# Patient Record
Sex: FEMALE | Race: WHITE | Employment: UNEMPLOYED | URBAN - METROPOLITAN AREA
[De-identification: names, ages, dates, MRNs, and addresses within clinical notes are randomized per-mention and may not be internally consistent; named-entity substitution may affect disease eponyms.]

---

## 2023-09-13 ENCOUNTER — APPOINTMENT (OUTPATIENT)
Dept: CT IMAGING | Age: 78
End: 2023-09-13
Payer: MEDICARE

## 2023-09-13 ENCOUNTER — APPOINTMENT (OUTPATIENT)
Dept: GENERAL RADIOLOGY | Age: 78
End: 2023-09-13
Payer: MEDICARE

## 2023-09-13 ENCOUNTER — ANESTHESIA EVENT (OUTPATIENT)
Dept: OPERATING ROOM | Age: 78
End: 2023-09-13
Payer: MEDICARE

## 2023-09-13 ENCOUNTER — HOSPITAL ENCOUNTER (INPATIENT)
Age: 78
LOS: 8 days | Discharge: NURSING, REHAB OR LONG-TERM CARE | End: 2023-09-21
Attending: EMERGENCY MEDICINE | Admitting: SURGERY
Payer: MEDICARE

## 2023-09-13 DIAGNOSIS — S22.32XA CLOSED FRACTURE OF ONE RIB OF LEFT SIDE, INITIAL ENCOUNTER: ICD-10-CM

## 2023-09-13 DIAGNOSIS — S72.002A CLOSED LEFT HIP FRACTURE, INITIAL ENCOUNTER (HCC): ICD-10-CM
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
## 2023-09-13 DIAGNOSIS — Z01.810 PRE-OPERATIVE CARDIOVASCULAR EXAMINATION, HIGH RISK SURGERY: ICD-10-CM

## 2023-09-13 PROBLEM — S72.143S INTERTROCHANTERIC FRACTURE OF FEMUR, SEQUELA: Status: ACTIVE | Noted: 2023-09-13

## 2023-09-13 LAB
25(OH)D3 SERPL-MCNC: 23 NG/ML
ALBUMIN SERPL-MCNC: 3.6 G/DL (ref 3.5–5.2)
AMPHET UR QL SCN: NEGATIVE
ANION GAP SERPL CALCULATED.3IONS-SCNC: 13 MMOL/L (ref 9–17)
ANION GAP SERPL CALCULATED.3IONS-SCNC: 13 MMOL/L (ref 9–17)
BARBITURATES UR QL SCN: NEGATIVE
BENZODIAZ UR QL: NEGATIVE
BLOOD BANK SPECIMEN: ABNORMAL
BODY TEMPERATURE: 37
BUN SERPL-MCNC: 17 MG/DL (ref 8–23)
BUN SERPL-MCNC: 24 MG/DL (ref 8–23)
CALCIUM SERPL-MCNC: 9.1 MG/DL (ref 8.6–10.4)
CANNABINOIDS UR QL SCN: NEGATIVE
CHLORIDE SERPL-SCNC: 97 MMOL/L (ref 98–107)
CHLORIDE SERPL-SCNC: 98 MMOL/L (ref 98–107)
CO2 SERPL-SCNC: 21 MMOL/L (ref 20–31)
CO2 SERPL-SCNC: 22 MMOL/L (ref 20–31)
COCAINE UR QL SCN: NEGATIVE
COHGB MFR BLD: 3.2 % (ref 0–5)
CREAT SERPL-MCNC: 0.8 MG/DL (ref 0.5–0.9)
CREAT SERPL-MCNC: 1.1 MG/DL (ref 0.5–0.9)
ERYTHROCYTE [DISTWIDTH] IN BLOOD BY AUTOMATED COUNT: 15.5 % (ref 11.8–14.4)
EST. AVERAGE GLUCOSE BLD GHB EST-MCNC: 303 MG/DL
ETHANOL PERCENT: <0.01 %
ETHANOLAMINE SERPL-MCNC: <10 MG/DL
FENTANYL UR QL: NEGATIVE
FIO2 ON VENT: ABNORMAL %
GFR SERPL CREATININE-BSD FRML MDRD: 52 ML/MIN/1.73M2
GFR SERPL CREATININE-BSD FRML MDRD: >60 ML/MIN/1.73M2
GLUCOSE BLD-MCNC: 225 MG/DL (ref 65–105)
GLUCOSE BLD-MCNC: 267 MG/DL (ref 65–105)
GLUCOSE BLD-MCNC: 283 MG/DL (ref 65–105)
GLUCOSE BLD-MCNC: 320 MG/DL (ref 65–105)
GLUCOSE BLD-MCNC: 325 MG/DL (ref 65–105)
GLUCOSE BLD-MCNC: 361 MG/DL (ref 65–105)
GLUCOSE BLD-MCNC: 394 MG/DL (ref 65–105)
GLUCOSE BLD-MCNC: 443 MG/DL (ref 65–105)
GLUCOSE BLD-MCNC: 505 MG/DL (ref 65–105)
GLUCOSE BLD-MCNC: 529 MG/DL (ref 65–105)
GLUCOSE SERPL-MCNC: 276 MG/DL (ref 70–99)
GLUCOSE SERPL-MCNC: 605 MG/DL (ref 70–99)
HBA1C MFR BLD: 12.2 % (ref 4–6)
HCG SERPL QL: ABNORMAL
HCO3 VENOUS: 21.9 MMOL/L (ref 24–30)
HCT VFR BLD AUTO: 32.9 % (ref 36.3–47.1)
HGB BLD-MCNC: 10 G/DL (ref 11.9–15.1)
INR PPP: 1.3
MAGNESIUM SERPL-MCNC: 1.9 MG/DL (ref 1.6–2.6)
MCH RBC QN AUTO: 25.6 PG (ref 25.2–33.5)
MCHC RBC AUTO-ENTMCNC: 30.4 G/DL (ref 28.4–34.8)
MCV RBC AUTO: 84.4 FL (ref 82.6–102.9)
METHADONE UR QL: NEGATIVE
NEGATIVE BASE EXCESS, VEN: 3.1 MMOL/L (ref 0–2)
NRBC BLD-RTO: 0 PER 100 WBC
O2 SAT, VEN: 98.5 % (ref 60–85)
OPIATES UR QL SCN: POSITIVE
OXYCODONE UR QL SCN: NEGATIVE
PARTIAL THROMBOPLASTIN TIME: 23.3 SEC (ref 23–36.5)
PCO2, VEN: 41.6 MM HG (ref 39–55)
PCP UR QL SCN: NEGATIVE
PH VENOUS: 7.34 (ref 7.32–7.42)
PLATELET # BLD AUTO: 235 K/UL (ref 138–453)
PMV BLD AUTO: 11.3 FL (ref 8.1–13.5)
PO2, VEN: 131 MM HG (ref 30–50)
POTASSIUM SERPL-SCNC: 3.8 MMOL/L (ref 3.7–5.3)
POTASSIUM SERPL-SCNC: 4.3 MMOL/L (ref 3.7–5.3)
PROTHROMBIN TIME: 15.6 SEC (ref 11.7–14.9)
RBC # BLD AUTO: 3.9 M/UL (ref 3.95–5.11)
SODIUM SERPL-SCNC: 131 MMOL/L (ref 135–144)
SODIUM SERPL-SCNC: 133 MMOL/L (ref 135–144)
TEST INFORMATION: ABNORMAL
WBC OTHER # BLD: 11.3 K/UL (ref 3.5–11.3)

## 2023-09-13 PROCEDURE — 86850 RBC ANTIBODY SCREEN: CPT

## 2023-09-13 PROCEDURE — 83735 ASSAY OF MAGNESIUM: CPT

## 2023-09-13 PROCEDURE — 82306 VITAMIN D 25 HYDROXY: CPT

## 2023-09-13 PROCEDURE — 86901 BLOOD TYPING SEROLOGIC RH(D): CPT

## 2023-09-13 PROCEDURE — 82805 BLOOD GASES W/O2 SATURATION: CPT

## 2023-09-13 PROCEDURE — 71260 CT THORAX DX C+: CPT

## 2023-09-13 PROCEDURE — 85027 COMPLETE CBC AUTOMATED: CPT

## 2023-09-13 PROCEDURE — 72125 CT NECK SPINE W/O DYE: CPT

## 2023-09-13 PROCEDURE — 6370000000 HC RX 637 (ALT 250 FOR IP): Performed by: NURSE PRACTITIONER

## 2023-09-13 PROCEDURE — 71045 X-RAY EXAM CHEST 1 VIEW: CPT

## 2023-09-13 PROCEDURE — 85610 PROTHROMBIN TIME: CPT

## 2023-09-13 PROCEDURE — 99222 1ST HOSP IP/OBS MODERATE 55: CPT | Performed by: SURGERY

## 2023-09-13 PROCEDURE — 2060000002 HC BURN ICU INTERMEDIATE R&B

## 2023-09-13 PROCEDURE — 80048 BASIC METABOLIC PNL TOTAL CA: CPT

## 2023-09-13 PROCEDURE — 80307 DRUG TEST PRSMV CHEM ANLYZR: CPT

## 2023-09-13 PROCEDURE — 82040 ASSAY OF SERUM ALBUMIN: CPT

## 2023-09-13 PROCEDURE — 73502 X-RAY EXAM HIP UNI 2-3 VIEWS: CPT

## 2023-09-13 PROCEDURE — 73562 X-RAY EXAM OF KNEE 3: CPT

## 2023-09-13 PROCEDURE — 70450 CT HEAD/BRAIN W/O DYE: CPT

## 2023-09-13 PROCEDURE — 93005 ELECTROCARDIOGRAM TRACING: CPT | Performed by: STUDENT IN AN ORGANIZED HEALTH CARE EDUCATION/TRAINING PROGRAM

## 2023-09-13 PROCEDURE — 36415 COLL VENOUS BLD VENIPUNCTURE: CPT

## 2023-09-13 PROCEDURE — 83036 HEMOGLOBIN GLYCOSYLATED A1C: CPT

## 2023-09-13 PROCEDURE — 82947 ASSAY GLUCOSE BLOOD QUANT: CPT

## 2023-09-13 PROCEDURE — 86920 COMPATIBILITY TEST SPIN: CPT

## 2023-09-13 PROCEDURE — 2580000003 HC RX 258: Performed by: NURSE PRACTITIONER

## 2023-09-13 PROCEDURE — 86900 BLOOD TYPING SEROLOGIC ABO: CPT

## 2023-09-13 PROCEDURE — 84520 ASSAY OF UREA NITROGEN: CPT

## 2023-09-13 PROCEDURE — 99233 SBSQ HOSP IP/OBS HIGH 50: CPT | Performed by: INTERNAL MEDICINE

## 2023-09-13 PROCEDURE — 82565 ASSAY OF CREATININE: CPT

## 2023-09-13 PROCEDURE — 80051 ELECTROLYTE PANEL: CPT

## 2023-09-13 PROCEDURE — 99285 EMERGENCY DEPT VISIT HI MDM: CPT

## 2023-09-13 PROCEDURE — G0480 DRUG TEST DEF 1-7 CLASSES: HCPCS

## 2023-09-13 PROCEDURE — 84703 CHORIONIC GONADOTROPIN ASSAY: CPT

## 2023-09-13 PROCEDURE — 96374 THER/PROPH/DIAG INJ IV PUSH: CPT

## 2023-09-13 PROCEDURE — 96375 TX/PRO/DX INJ NEW DRUG ADDON: CPT

## 2023-09-13 PROCEDURE — 6360000002 HC RX W HCPCS: Performed by: EMERGENCY MEDICINE

## 2023-09-13 PROCEDURE — 85730 THROMBOPLASTIN TIME PARTIAL: CPT

## 2023-09-13 PROCEDURE — 6360000004 HC RX CONTRAST MEDICATION: Performed by: EMERGENCY MEDICINE

## 2023-09-13 RX ORDER — ACETAMINOPHEN 500 MG
1000 TABLET ORAL EVERY 8 HOURS SCHEDULED
Status: DISCONTINUED | OUTPATIENT
Start: 2023-09-13 | End: 2023-09-21 | Stop reason: HOSPADM

## 2023-09-13 RX ORDER — OMEPRAZOLE 10 MG/1
40 CAPSULE, DELAYED RELEASE ORAL DAILY
COMMUNITY

## 2023-09-13 RX ORDER — GABAPENTIN 300 MG/1
300 CAPSULE ORAL 3 TIMES DAILY
Status: DISCONTINUED | OUTPATIENT
Start: 2023-09-13 | End: 2023-09-18

## 2023-09-13 RX ORDER — FUROSEMIDE 20 MG/1
20 TABLET ORAL 2 TIMES DAILY
COMMUNITY

## 2023-09-13 RX ORDER — OXYCODONE HYDROCHLORIDE 5 MG/1
5 TABLET ORAL EVERY 4 HOURS PRN
Status: DISCONTINUED | OUTPATIENT
Start: 2023-09-13 | End: 2023-09-18

## 2023-09-13 RX ORDER — SENNA AND DOCUSATE SODIUM 50; 8.6 MG/1; MG/1
1 TABLET, FILM COATED ORAL 2 TIMES DAILY
Status: DISCONTINUED | OUTPATIENT
Start: 2023-09-13 | End: 2023-09-20

## 2023-09-13 RX ORDER — LOSARTAN POTASSIUM 50 MG/1
50 TABLET ORAL DAILY
COMMUNITY

## 2023-09-13 RX ORDER — METFORMIN HYDROCHLORIDE 500 MG/1
500 TABLET, EXTENDED RELEASE ORAL NIGHTLY
COMMUNITY

## 2023-09-13 RX ORDER — DEXTROSE MONOHYDRATE 100 MG/ML
INJECTION, SOLUTION INTRAVENOUS CONTINUOUS PRN
Status: DISCONTINUED | OUTPATIENT
Start: 2023-09-13 | End: 2023-09-19

## 2023-09-13 RX ORDER — SPIRONOLACTONE 50 MG/1
50 TABLET, FILM COATED ORAL DAILY
COMMUNITY

## 2023-09-13 RX ORDER — MORPHINE SULFATE 4 MG/ML
4 INJECTION, SOLUTION INTRAMUSCULAR; INTRAVENOUS ONCE
Status: COMPLETED | OUTPATIENT
Start: 2023-09-13 | End: 2023-09-13

## 2023-09-13 RX ORDER — DEXTROSE MONOHYDRATE 100 MG/ML
INJECTION, SOLUTION INTRAVENOUS CONTINUOUS PRN
Status: CANCELLED | OUTPATIENT
Start: 2023-09-13

## 2023-09-13 RX ORDER — POLYETHYLENE GLYCOL 3350 17 G/17G
17 POWDER, FOR SOLUTION ORAL DAILY
Status: DISCONTINUED | OUTPATIENT
Start: 2023-09-13 | End: 2023-09-21 | Stop reason: HOSPADM

## 2023-09-13 RX ORDER — GLIMEPIRIDE 4 MG/1
4 TABLET ORAL 2 TIMES DAILY
COMMUNITY

## 2023-09-13 RX ORDER — MAGNESIUM HYDROXIDE/ALUMINUM HYDROXICE/SIMETHICONE 120; 1200; 1200 MG/30ML; MG/30ML; MG/30ML
30 SUSPENSION ORAL EVERY 6 HOURS PRN
Status: DISCONTINUED | OUTPATIENT
Start: 2023-09-13 | End: 2023-09-21 | Stop reason: HOSPADM

## 2023-09-13 RX ORDER — ONDANSETRON 2 MG/ML
4 INJECTION INTRAMUSCULAR; INTRAVENOUS ONCE
Status: COMPLETED | OUTPATIENT
Start: 2023-09-13 | End: 2023-09-13

## 2023-09-13 RX ORDER — METHOCARBAMOL 750 MG/1
750 TABLET, FILM COATED ORAL 4 TIMES DAILY
Status: DISCONTINUED | OUTPATIENT
Start: 2023-09-13 | End: 2023-09-18

## 2023-09-13 RX ADMIN — MORPHINE SULFATE 4 MG: 4 INJECTION INTRAVENOUS at 11:28

## 2023-09-13 RX ADMIN — SENNOSIDES AND DOCUSATE SODIUM 1 TABLET: 50; 8.6 TABLET ORAL at 21:37

## 2023-09-13 RX ADMIN — GABAPENTIN 300 MG: 300 CAPSULE ORAL at 21:38

## 2023-09-13 RX ADMIN — ACETAMINOPHEN 1000 MG: 500 TABLET ORAL at 21:37

## 2023-09-13 RX ADMIN — SODIUM CHLORIDE 8.9 UNITS/HR: 9 INJECTION, SOLUTION INTRAVENOUS at 14:24

## 2023-09-13 RX ADMIN — SODIUM CHLORIDE 15.61 UNITS/HR: 9 INJECTION, SOLUTION INTRAVENOUS at 21:46

## 2023-09-13 RX ADMIN — OXYCODONE HYDROCHLORIDE 5 MG: 5 TABLET ORAL at 19:31

## 2023-09-13 RX ADMIN — METHOCARBAMOL 750 MG: 750 TABLET ORAL at 21:37

## 2023-09-13 RX ADMIN — IOPAMIDOL 75 ML: 755 INJECTION, SOLUTION INTRAVENOUS at 11:36

## 2023-09-13 RX ADMIN — ONDANSETRON 4 MG: 2 INJECTION INTRAMUSCULAR; INTRAVENOUS at 11:28

## 2023-09-13 ASSESSMENT — PAIN DESCRIPTION - ORIENTATION
ORIENTATION: LEFT

## 2023-09-13 ASSESSMENT — PAIN SCALES - GENERAL
PAINLEVEL_OUTOF10: 5
PAINLEVEL_OUTOF10: 7
PAINLEVEL_OUTOF10: 8
PAINLEVEL_OUTOF10: 6

## 2023-09-13 ASSESSMENT — PAIN - FUNCTIONAL ASSESSMENT
PAIN_FUNCTIONAL_ASSESSMENT: PREVENTS OR INTERFERES SOME ACTIVE ACTIVITIES AND ADLS
PAIN_FUNCTIONAL_ASSESSMENT: 0-10
PAIN_FUNCTIONAL_ASSESSMENT: PREVENTS OR INTERFERES SOME ACTIVE ACTIVITIES AND ADLS
PAIN_FUNCTIONAL_ASSESSMENT: PREVENTS OR INTERFERES SOME ACTIVE ACTIVITIES AND ADLS

## 2023-09-13 ASSESSMENT — PAIN DESCRIPTION - PAIN TYPE
TYPE: ACUTE PAIN
TYPE: ACUTE PAIN

## 2023-09-13 ASSESSMENT — PAIN DESCRIPTION - LOCATION
LOCATION: HIP

## 2023-09-13 ASSESSMENT — PAIN DESCRIPTION - DESCRIPTORS
DESCRIPTORS: DISCOMFORT

## 2023-09-13 ASSESSMENT — PAIN DESCRIPTION - FREQUENCY: FREQUENCY: CONTINUOUS

## 2023-09-13 NOTE — ED NOTES
Per spiritual care, unable to contact son or daughter in law, voice messages left      Daly Loja RN  09/13/23 5261
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
## 2023-09-13 NOTE — PROGRESS NOTES
15 Variable Trauma-Specific Frailty Index   Comorbidities   Cancer History Yes (1) No (0)   Coronary Heart Disease MI (1) CABG (0.75) Mild (0.25)  No (0)   Dementia Severe (1) Moderate (0.5) Mild (0.25)  No (0)   Daily Activities   Help With Grooming Yes (1) No (0)   Help with Managing Money Yes (1) No (0)   Help doing Housework Yes (1) No (0)   Help with Toileting Yes (1) No (0)   Help Walking Wheelchair (1) Walker (0.75) Cane (0.5) No (0)   Health Attitude   Feel Less Useful Most Time (1) Sometimes (0.5) Never (0)   Feel Sad Most Time (1) Sometimes (0.5) Never (0)   Feel Effort to Do Everything Most Time (1) Sometimes (0.5) Never (0)   Feels Lonely Most Time (1) Sometimes (0.5) Never (0)   Falls Most Time (1) Sometimes (0.5) Never (0)   Function   Sexually Active Yes (0)  No(1)   Nutrition   Albumin < 3g/dL (1)  > 3g/dL   Scoring   Score  1 FI (Score/15)  0.06 > 0.25 = Frail   *Based on 2-weeks prior to hospital admission   Trauma Specific Fraility Index > or = to 4 (4/15 = 0.26)  Trauma Specific Fraility Index Score:    0.06
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
## 2023-09-13 NOTE — ED NOTES
Writer entered room pt find pt laying on side. Pt educated on importance of laying on her back. Pt acknowledged teaching.       Alem Bauer RN  09/13/23 6486

## 2023-09-14 ENCOUNTER — ANESTHESIA (OUTPATIENT)
Dept: OPERATING ROOM | Age: 78
End: 2023-09-14
Payer: MEDICARE

## 2023-09-14 ENCOUNTER — APPOINTMENT (OUTPATIENT)
Dept: GENERAL RADIOLOGY | Age: 78
End: 2023-09-14
Payer: MEDICARE

## 2023-09-14 PROBLEM — K74.60 CIRRHOSIS OF LIVER WITH ASCITES (HCC): Status: ACTIVE | Noted: 2023-09-14

## 2023-09-14 PROBLEM — E11.65 TYPE 2 DIABETES MELLITUS WITH HYPERGLYCEMIA, WITHOUT LONG-TERM CURRENT USE OF INSULIN (HCC): Status: ACTIVE | Noted: 2023-09-14

## 2023-09-14 PROBLEM — R18.8 CIRRHOSIS OF LIVER WITH ASCITES (HCC): Status: ACTIVE | Noted: 2023-09-14

## 2023-09-14 PROBLEM — Z87.81 S/P LEFT HIP FRACTURE: Status: ACTIVE | Noted: 2023-09-14

## 2023-09-14 LAB
ALBUMIN SERPL-MCNC: 3.2 G/DL (ref 3.5–5.2)
ALBUMIN/GLOB SERPL: 1.2 {RATIO} (ref 1–2.5)
ALP SERPL-CCNC: 96 U/L (ref 35–104)
ALT SERPL-CCNC: 20 U/L (ref 5–33)
ANION GAP SERPL CALCULATED.3IONS-SCNC: 15 MMOL/L (ref 9–17)
AST SERPL-CCNC: 26 U/L
BASOPHILS # BLD: 0.03 K/UL (ref 0–0.2)
BASOPHILS NFR BLD: 0 % (ref 0–2)
BILIRUB SERPL-MCNC: 0.5 MG/DL (ref 0.3–1.2)
BUN SERPL-MCNC: 32 MG/DL (ref 8–23)
CALCIUM SERPL-MCNC: 8.7 MG/DL (ref 8.6–10.4)
CHLORIDE SERPL-SCNC: 93 MMOL/L (ref 98–107)
CO2 SERPL-SCNC: 21 MMOL/L (ref 20–31)
CREAT SERPL-MCNC: 1.1 MG/DL (ref 0.5–0.9)
EOSINOPHIL # BLD: 0.03 K/UL (ref 0–0.44)
EOSINOPHILS RELATIVE PERCENT: 0 % (ref 1–4)
ERYTHROCYTE [DISTWIDTH] IN BLOOD BY AUTOMATED COUNT: 16.1 % (ref 11.8–14.4)
GFR SERPL CREATININE-BSD FRML MDRD: 52 ML/MIN/1.73M2
GLUCOSE BLD-MCNC: 107 MG/DL (ref 65–105)
GLUCOSE BLD-MCNC: 119 MG/DL (ref 65–105)
GLUCOSE BLD-MCNC: 128 MG/DL (ref 65–105)
GLUCOSE BLD-MCNC: 134 MG/DL (ref 65–105)
GLUCOSE BLD-MCNC: 136 MG/DL (ref 65–105)
GLUCOSE BLD-MCNC: 139 MG/DL (ref 65–105)
GLUCOSE BLD-MCNC: 142 MG/DL (ref 65–105)
GLUCOSE BLD-MCNC: 144 MG/DL (ref 65–105)
GLUCOSE BLD-MCNC: 149 MG/DL (ref 65–105)
GLUCOSE BLD-MCNC: 151 MG/DL (ref 65–105)
GLUCOSE BLD-MCNC: 163 MG/DL (ref 65–105)
GLUCOSE BLD-MCNC: 178 MG/DL (ref 65–105)
GLUCOSE BLD-MCNC: 319 MG/DL (ref 65–105)
GLUCOSE BLD-MCNC: 377 MG/DL (ref 65–105)
GLUCOSE BLD-MCNC: 572 MG/DL (ref 65–105)
GLUCOSE BLD-MCNC: 81 MG/DL (ref 65–105)
GLUCOSE BLD-MCNC: 84 MG/DL (ref 65–105)
GLUCOSE SERPL-MCNC: 353 MG/DL (ref 70–99)
HCT VFR BLD AUTO: 26.8 % (ref 36.3–47.1)
HGB BLD-MCNC: 8.2 G/DL (ref 11.9–15.1)
IMM GRANULOCYTES # BLD AUTO: 0.07 K/UL (ref 0–0.3)
IMM GRANULOCYTES NFR BLD: 1 %
LYMPHOCYTES NFR BLD: 0.87 K/UL (ref 1.1–3.7)
LYMPHOCYTES RELATIVE PERCENT: 7 % (ref 24–43)
MCH RBC QN AUTO: 26.5 PG (ref 25.2–33.5)
MCHC RBC AUTO-ENTMCNC: 30.6 G/DL (ref 28.4–34.8)
MCV RBC AUTO: 86.5 FL (ref 82.6–102.9)
MONOCYTES NFR BLD: 1.43 K/UL (ref 0.1–1.2)
MONOCYTES NFR BLD: 11 % (ref 3–12)
NEUTROPHILS NFR BLD: 81 % (ref 36–65)
NEUTS SEG NFR BLD: 10.49 K/UL (ref 1.5–8.1)
NRBC BLD-RTO: 0 PER 100 WBC
PLATELET # BLD AUTO: 179 K/UL (ref 138–453)
PMV BLD AUTO: 12.2 FL (ref 8.1–13.5)
POTASSIUM SERPL-SCNC: 4.9 MMOL/L (ref 3.7–5.3)
PROT SERPL-MCNC: 5.8 G/DL (ref 6.4–8.3)
RBC # BLD AUTO: 3.1 M/UL (ref 3.95–5.11)
RBC # BLD: ABNORMAL 10*6/UL
SODIUM SERPL-SCNC: 129 MMOL/L (ref 135–144)
WBC OTHER # BLD: 12.9 K/UL (ref 3.5–11.3)

## 2023-09-14 PROCEDURE — 6360000002 HC RX W HCPCS

## 2023-09-14 PROCEDURE — 3700000000 HC ANESTHESIA ATTENDED CARE: Performed by: ORTHOPAEDIC SURGERY

## 2023-09-14 PROCEDURE — 6360000002 HC RX W HCPCS: Performed by: ANESTHESIOLOGY

## 2023-09-14 PROCEDURE — 2580000003 HC RX 258

## 2023-09-14 PROCEDURE — 85025 COMPLETE CBC W/AUTO DIFF WBC: CPT

## 2023-09-14 PROCEDURE — 99222 1ST HOSP IP/OBS MODERATE 55: CPT | Performed by: ORTHOPAEDIC SURGERY

## 2023-09-14 PROCEDURE — 3700000001 HC ADD 15 MINUTES (ANESTHESIA): Performed by: ORTHOPAEDIC SURGERY

## 2023-09-14 PROCEDURE — 36415 COLL VENOUS BLD VENIPUNCTURE: CPT

## 2023-09-14 PROCEDURE — C9399 UNCLASSIFIED DRUGS OR BIOLOG: HCPCS | Performed by: ANESTHESIOLOGY

## 2023-09-14 PROCEDURE — 6370000000 HC RX 637 (ALT 250 FOR IP): Performed by: NURSE PRACTITIONER

## 2023-09-14 PROCEDURE — 2580000003 HC RX 258: Performed by: ORTHOPAEDIC SURGERY

## 2023-09-14 PROCEDURE — 1200000000 HC SEMI PRIVATE

## 2023-09-14 PROCEDURE — C1713 ANCHOR/SCREW BN/BN,TIS/BN: HCPCS | Performed by: ORTHOPAEDIC SURGERY

## 2023-09-14 PROCEDURE — 3600000005 HC SURGERY LEVEL 5 BASE: Performed by: ORTHOPAEDIC SURGERY

## 2023-09-14 PROCEDURE — 99222 1ST HOSP IP/OBS MODERATE 55: CPT | Performed by: STUDENT IN AN ORGANIZED HEALTH CARE EDUCATION/TRAINING PROGRAM

## 2023-09-14 PROCEDURE — 73502 X-RAY EXAM HIP UNI 2-3 VIEWS: CPT

## 2023-09-14 PROCEDURE — 6370000000 HC RX 637 (ALT 250 FOR IP)

## 2023-09-14 PROCEDURE — 7100000000 HC PACU RECOVERY - FIRST 15 MIN: Performed by: ORTHOPAEDIC SURGERY

## 2023-09-14 PROCEDURE — 73552 X-RAY EXAM OF FEMUR 2/>: CPT

## 2023-09-14 PROCEDURE — 2720000010 HC SURG SUPPLY STERILE: Performed by: ORTHOPAEDIC SURGERY

## 2023-09-14 PROCEDURE — 82947 ASSAY GLUCOSE BLOOD QUANT: CPT

## 2023-09-14 PROCEDURE — 7100000001 HC PACU RECOVERY - ADDTL 15 MIN: Performed by: ORTHOPAEDIC SURGERY

## 2023-09-14 PROCEDURE — 80053 COMPREHEN METABOLIC PANEL: CPT

## 2023-09-14 PROCEDURE — 3600000015 HC SURGERY LEVEL 5 ADDTL 15MIN: Performed by: ORTHOPAEDIC SURGERY

## 2023-09-14 PROCEDURE — 2580000003 HC RX 258: Performed by: ANESTHESIOLOGY

## 2023-09-14 PROCEDURE — 2500000003 HC RX 250 WO HCPCS: Performed by: ANESTHESIOLOGY

## 2023-09-14 PROCEDURE — 99232 SBSQ HOSP IP/OBS MODERATE 35: CPT | Performed by: SURGERY

## 2023-09-14 PROCEDURE — 2709999900 HC NON-CHARGEABLE SUPPLY: Performed by: ORTHOPAEDIC SURGERY

## 2023-09-14 PROCEDURE — 0QSC04Z REPOSITION LEFT LOWER FEMUR WITH INTERNAL FIXATION DEVICE, OPEN APPROACH: ICD-10-PCS | Performed by: ORTHOPAEDIC SURGERY

## 2023-09-14 DEVICE — TRIGEN LOW PROFILE SCREW 5.0MM X 32.5MM
Type: IMPLANTABLE DEVICE | Site: FEMUR | Status: NON-FUNCTIONAL
Brand: TRIGEN
Removed: 2023-09-15

## 2023-09-14 DEVICE — TRIGEN INTERTAN 10S 10MM X 36CM 125DEGREE LEFT
Type: IMPLANTABLE DEVICE | Site: FEMUR | Status: NON-FUNCTIONAL
Brand: TRIGEN
Removed: 2023-09-15

## 2023-09-14 DEVICE — TRIGEN LOW PROFILE SCREW 5.0MM X 27.5MM
Type: IMPLANTABLE DEVICE | Site: FEMUR | Status: NON-FUNCTIONAL
Brand: TRIGEN
Removed: 2023-09-15

## 2023-09-14 DEVICE — INTERTAN LAG/COMPRESSION SCREW KIT                                    80MM / 75MM
Type: IMPLANTABLE DEVICE | Site: FEMUR | Status: FUNCTIONAL
Brand: TRIGEN

## 2023-09-14 RX ORDER — SODIUM CHLORIDE, SODIUM LACTATE, POTASSIUM CHLORIDE, CALCIUM CHLORIDE 600; 310; 30; 20 MG/100ML; MG/100ML; MG/100ML; MG/100ML
INJECTION, SOLUTION INTRAVENOUS CONTINUOUS
Status: DISCONTINUED | OUTPATIENT
Start: 2023-09-14 | End: 2023-09-15

## 2023-09-14 RX ORDER — FENTANYL CITRATE 50 UG/ML
25 INJECTION, SOLUTION INTRAMUSCULAR; INTRAVENOUS EVERY 5 MIN PRN
Status: DISCONTINUED | OUTPATIENT
Start: 2023-09-14 | End: 2023-09-14 | Stop reason: HOSPADM

## 2023-09-14 RX ORDER — MORPHINE SULFATE 2 MG/ML
2 INJECTION, SOLUTION INTRAMUSCULAR; INTRAVENOUS EVERY 5 MIN PRN
Status: DISCONTINUED | OUTPATIENT
Start: 2023-09-14 | End: 2023-09-14 | Stop reason: HOSPADM

## 2023-09-14 RX ORDER — DEXAMETHASONE SODIUM PHOSPHATE 10 MG/ML
INJECTION INTRAMUSCULAR; INTRAVENOUS PRN
Status: DISCONTINUED | OUTPATIENT
Start: 2023-09-14 | End: 2023-09-14 | Stop reason: SDUPTHER

## 2023-09-14 RX ORDER — ONDANSETRON 2 MG/ML
INJECTION INTRAMUSCULAR; INTRAVENOUS PRN
Status: DISCONTINUED | OUTPATIENT
Start: 2023-09-14 | End: 2023-09-14 | Stop reason: SDUPTHER

## 2023-09-14 RX ORDER — ROCURONIUM BROMIDE 10 MG/ML
INJECTION, SOLUTION INTRAVENOUS PRN
Status: DISCONTINUED | OUTPATIENT
Start: 2023-09-14 | End: 2023-09-14 | Stop reason: SDUPTHER

## 2023-09-14 RX ORDER — SODIUM CHLORIDE 9 MG/ML
INJECTION, SOLUTION INTRAVENOUS PRN
Status: DISCONTINUED | OUTPATIENT
Start: 2023-09-14 | End: 2023-09-14 | Stop reason: HOSPADM

## 2023-09-14 RX ORDER — INSULIN LISPRO 100 [IU]/ML
0-16 INJECTION, SOLUTION INTRAVENOUS; SUBCUTANEOUS
Status: DISCONTINUED | OUTPATIENT
Start: 2023-09-14 | End: 2023-09-14

## 2023-09-14 RX ORDER — PROPOFOL 10 MG/ML
INJECTION, EMULSION INTRAVENOUS PRN
Status: DISCONTINUED | OUTPATIENT
Start: 2023-09-14 | End: 2023-09-14 | Stop reason: SDUPTHER

## 2023-09-14 RX ORDER — MAGNESIUM SULFATE HEPTAHYDRATE 40 MG/ML
INJECTION, SOLUTION INTRAVENOUS PRN
Status: DISCONTINUED | OUTPATIENT
Start: 2023-09-14 | End: 2023-09-14 | Stop reason: SDUPTHER

## 2023-09-14 RX ORDER — SODIUM CHLORIDE 0.9 % (FLUSH) 0.9 %
5-40 SYRINGE (ML) INJECTION PRN
Status: DISCONTINUED | OUTPATIENT
Start: 2023-09-14 | End: 2023-09-14 | Stop reason: HOSPADM

## 2023-09-14 RX ORDER — ONDANSETRON 2 MG/ML
4 INJECTION INTRAMUSCULAR; INTRAVENOUS
Status: DISCONTINUED | OUTPATIENT
Start: 2023-09-14 | End: 2023-09-14 | Stop reason: HOSPADM

## 2023-09-14 RX ORDER — MAGNESIUM HYDROXIDE 1200 MG/15ML
LIQUID ORAL CONTINUOUS PRN
Status: COMPLETED | OUTPATIENT
Start: 2023-09-14 | End: 2023-09-14

## 2023-09-14 RX ORDER — MAGNESIUM HYDROXIDE 1200 MG/15ML
LIQUID ORAL PRN
Status: DISCONTINUED | OUTPATIENT
Start: 2023-09-14 | End: 2023-09-14 | Stop reason: ALTCHOICE

## 2023-09-14 RX ORDER — INSULIN LISPRO 100 [IU]/ML
0-4 INJECTION, SOLUTION INTRAVENOUS; SUBCUTANEOUS NIGHTLY
Status: DISCONTINUED | OUTPATIENT
Start: 2023-09-14 | End: 2023-09-14

## 2023-09-14 RX ORDER — SODIUM CHLORIDE, SODIUM LACTATE, POTASSIUM CHLORIDE, CALCIUM CHLORIDE 600; 310; 30; 20 MG/100ML; MG/100ML; MG/100ML; MG/100ML
INJECTION, SOLUTION INTRAVENOUS CONTINUOUS PRN
Status: DISCONTINUED | OUTPATIENT
Start: 2023-09-14 | End: 2023-09-14 | Stop reason: SDUPTHER

## 2023-09-14 RX ORDER — SODIUM CHLORIDE, SODIUM LACTATE, POTASSIUM CHLORIDE, CALCIUM CHLORIDE 600; 310; 30; 20 MG/100ML; MG/100ML; MG/100ML; MG/100ML
INJECTION, SOLUTION INTRAVENOUS CONTINUOUS
Status: DISCONTINUED | OUTPATIENT
Start: 2023-09-14 | End: 2023-09-14

## 2023-09-14 RX ORDER — DIPHENHYDRAMINE HYDROCHLORIDE 50 MG/ML
12.5 INJECTION INTRAMUSCULAR; INTRAVENOUS
Status: DISCONTINUED | OUTPATIENT
Start: 2023-09-14 | End: 2023-09-14 | Stop reason: HOSPADM

## 2023-09-14 RX ORDER — ERGOCALCIFEROL 1.25 MG/1
50000 CAPSULE ORAL WEEKLY
Qty: 12 CAPSULE | Refills: 1 | Status: SHIPPED | OUTPATIENT
Start: 2023-09-14 | End: 2023-09-21 | Stop reason: SDUPTHER

## 2023-09-14 RX ORDER — LIDOCAINE HYDROCHLORIDE 10 MG/ML
INJECTION, SOLUTION INFILTRATION; PERINEURAL PRN
Status: DISCONTINUED | OUTPATIENT
Start: 2023-09-14 | End: 2023-09-14 | Stop reason: SDUPTHER

## 2023-09-14 RX ORDER — SODIUM CHLORIDE 0.9 % (FLUSH) 0.9 %
5-40 SYRINGE (ML) INJECTION EVERY 12 HOURS SCHEDULED
Status: DISCONTINUED | OUTPATIENT
Start: 2023-09-14 | End: 2023-09-14 | Stop reason: HOSPADM

## 2023-09-14 RX ORDER — FENTANYL CITRATE 50 UG/ML
INJECTION, SOLUTION INTRAMUSCULAR; INTRAVENOUS PRN
Status: DISCONTINUED | OUTPATIENT
Start: 2023-09-14 | End: 2023-09-14 | Stop reason: SDUPTHER

## 2023-09-14 RX ADMIN — ROCURONIUM BROMIDE 10 MG: 10 INJECTION, SOLUTION INTRAVENOUS at 08:23

## 2023-09-14 RX ADMIN — FENTANYL CITRATE 25 MCG: 50 INJECTION, SOLUTION INTRAMUSCULAR; INTRAVENOUS at 08:12

## 2023-09-14 RX ADMIN — METHOCARBAMOL 750 MG: 750 TABLET ORAL at 19:52

## 2023-09-14 RX ADMIN — LIDOCAINE HYDROCHLORIDE 50 MG: 10 INJECTION, SOLUTION EPIDURAL; INFILTRATION; INTRACAUDAL; PERINEURAL at 07:33

## 2023-09-14 RX ADMIN — PROPOFOL 100 MG: 10 INJECTION, EMULSION INTRAVENOUS at 07:33

## 2023-09-14 RX ADMIN — SENNOSIDES AND DOCUSATE SODIUM 1 TABLET: 50; 8.6 TABLET ORAL at 20:57

## 2023-09-14 RX ADMIN — PHENYLEPHRINE HYDROCHLORIDE 100 MCG: 10 INJECTION INTRAVENOUS at 07:52

## 2023-09-14 RX ADMIN — ROCURONIUM BROMIDE 10 MG: 10 INJECTION, SOLUTION INTRAVENOUS at 09:36

## 2023-09-14 RX ADMIN — ROCURONIUM BROMIDE 50 MG: 10 INJECTION, SOLUTION INTRAVENOUS at 07:33

## 2023-09-14 RX ADMIN — SODIUM CHLORIDE, POTASSIUM CHLORIDE, SODIUM LACTATE AND CALCIUM CHLORIDE: 600; 310; 30; 20 INJECTION, SOLUTION INTRAVENOUS at 01:39

## 2023-09-14 RX ADMIN — SODIUM CHLORIDE, POTASSIUM CHLORIDE, SODIUM LACTATE AND CALCIUM CHLORIDE: 600; 310; 30; 20 INJECTION, SOLUTION INTRAVENOUS at 19:53

## 2023-09-14 RX ADMIN — SODIUM CHLORIDE, POTASSIUM CHLORIDE, SODIUM LACTATE AND CALCIUM CHLORIDE: 600; 310; 30; 20 INJECTION, SOLUTION INTRAVENOUS at 07:24

## 2023-09-14 RX ADMIN — ACETAMINOPHEN 1000 MG: 500 TABLET ORAL at 13:58

## 2023-09-14 RX ADMIN — FENTANYL CITRATE 50 MCG: 50 INJECTION, SOLUTION INTRAMUSCULAR; INTRAVENOUS at 07:33

## 2023-09-14 RX ADMIN — FENTANYL CITRATE 25 MCG: 50 INJECTION, SOLUTION INTRAMUSCULAR; INTRAVENOUS at 08:23

## 2023-09-14 RX ADMIN — GABAPENTIN 300 MG: 300 CAPSULE ORAL at 20:57

## 2023-09-14 RX ADMIN — DEXAMETHASONE SODIUM PHOSPHATE 10 MG: 10 INJECTION INTRAMUSCULAR; INTRAVENOUS at 07:48

## 2023-09-14 RX ADMIN — METHOCARBAMOL 750 MG: 750 TABLET ORAL at 13:59

## 2023-09-14 RX ADMIN — SUGAMMADEX 120 MG: 100 INJECTION, SOLUTION INTRAVENOUS at 10:17

## 2023-09-14 RX ADMIN — FENTANYL CITRATE 25 MCG: 50 INJECTION INTRAMUSCULAR; INTRAVENOUS at 10:58

## 2023-09-14 RX ADMIN — PHENYLEPHRINE HYDROCHLORIDE 50 MCG/MIN: 10 INJECTION INTRAVENOUS at 08:05

## 2023-09-14 RX ADMIN — MAGNESIUM SULFATE HEPTAHYDRATE 2000 MG: 40 INJECTION, SOLUTION INTRAVENOUS at 08:06

## 2023-09-14 RX ADMIN — FENTANYL CITRATE 25 MCG: 50 INJECTION INTRAMUSCULAR; INTRAVENOUS at 10:50

## 2023-09-14 RX ADMIN — ONDANSETRON 4 MG: 2 INJECTION INTRAMUSCULAR; INTRAVENOUS at 09:53

## 2023-09-14 RX ADMIN — PHENYLEPHRINE HYDROCHLORIDE 100 MCG: 10 INJECTION INTRAVENOUS at 07:49

## 2023-09-14 RX ADMIN — FENTANYL CITRATE 50 MCG: 50 INJECTION, SOLUTION INTRAMUSCULAR; INTRAVENOUS at 09:35

## 2023-09-14 RX ADMIN — ACETAMINOPHEN 1000 MG: 500 TABLET ORAL at 20:57

## 2023-09-14 RX ADMIN — PHENYLEPHRINE HYDROCHLORIDE 100 MCG: 10 INJECTION INTRAVENOUS at 07:57

## 2023-09-14 RX ADMIN — INSULIN LISPRO 16 UNITS: 100 INJECTION, SOLUTION INTRAVENOUS; SUBCUTANEOUS at 17:00

## 2023-09-14 RX ADMIN — SODIUM CHLORIDE, POTASSIUM CHLORIDE, SODIUM LACTATE AND CALCIUM CHLORIDE: 600; 310; 30; 20 INJECTION, SOLUTION INTRAVENOUS at 07:23

## 2023-09-14 RX ADMIN — GABAPENTIN 300 MG: 300 CAPSULE ORAL at 13:58

## 2023-09-14 RX ADMIN — INSULIN LISPRO 4 UNITS: 100 INJECTION, SOLUTION INTRAVENOUS; SUBCUTANEOUS at 20:04

## 2023-09-14 RX ADMIN — Medication 2000 MG: at 17:01

## 2023-09-14 RX ADMIN — Medication 2000 MG: at 07:44

## 2023-09-14 RX ADMIN — ACETAMINOPHEN 1000 MG: 500 TABLET ORAL at 05:41

## 2023-09-14 RX ADMIN — PHENYLEPHRINE HYDROCHLORIDE 100 MCG: 10 INJECTION INTRAVENOUS at 08:03

## 2023-09-14 ASSESSMENT — PAIN SCALES - GENERAL
PAINLEVEL_OUTOF10: 5
PAINLEVEL_OUTOF10: 5
PAINLEVEL_OUTOF10: 4
PAINLEVEL_OUTOF10: 5
PAINLEVEL_OUTOF10: 4
PAINLEVEL_OUTOF10: 5
PAINLEVEL_OUTOF10: 4
PAINLEVEL_OUTOF10: 3
PAINLEVEL_OUTOF10: 10
PAINLEVEL_OUTOF10: 4

## 2023-09-14 ASSESSMENT — PAIN DESCRIPTION - ORIENTATION
ORIENTATION: LEFT

## 2023-09-14 ASSESSMENT — PAIN DESCRIPTION - LOCATION
LOCATION: HIP
LOCATION: LEG
LOCATION: HIP

## 2023-09-14 ASSESSMENT — PAIN - FUNCTIONAL ASSESSMENT
PAIN_FUNCTIONAL_ASSESSMENT: PREVENTS OR INTERFERES WITH MANY ACTIVE NOT PASSIVE ACTIVITIES
PAIN_FUNCTIONAL_ASSESSMENT: PREVENTS OR INTERFERES SOME ACTIVE ACTIVITIES AND ADLS

## 2023-09-14 ASSESSMENT — PAIN DESCRIPTION - DESCRIPTORS
DESCRIPTORS: ACHING;BURNING
DESCRIPTORS: ACHING;BURNING
DESCRIPTORS: DISCOMFORT
DESCRIPTORS: BURNING;DISCOMFORT;SORE

## 2023-09-14 ASSESSMENT — PAIN DESCRIPTION - PAIN TYPE: TYPE: ACUTE PAIN;SURGICAL PAIN

## 2023-09-14 ASSESSMENT — LIFESTYLE VARIABLES: SMOKING_STATUS: 0

## 2023-09-14 NOTE — ANESTHESIA POSTPROCEDURE EVALUATION
Department of Anesthesiology  Postprocedure Note    Patient: Lizzy Singleton  MRN: [id number]  [facility] [place] 1945  Date of evaluation: 9/14/2023      Procedure Summary     Date: 09/14/23 Room / Location: [address]    Anesthesia Start: 2019 Anesthesia Stop: 0412    Procedure: [address] (Left: Hip) Diagnosis:       Closed 2-part intertrochanteric fracture of left femur, initial encounter ([address])      (Closed 2-part intertrochanteric fracture of left femur, initial encounter ([address]) [Y48.167T])    Surgeons: Joey Nathan DO Responsible Provider: Jazmine Hoang MD    Anesthesia Type: general ASA Status: 3          Anesthesia Type: No value filed.     Cindy Phase I: Cindy Score: 8    Cindy Phase II:        Anesthesia Post Evaluation    Patient location during evaluation: PACU  Patient participation: complete - patient participated  Level of consciousness: awake  Pain score: 4  Cardiovascular status: hemodynamically stable  Respiratory status: nasal cannula  Pain management: satisfactory to patient
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
## 2023-09-14 NOTE — DISCHARGE INSTRUCTIONS
Orthopaedic Instructions:  -Weight bearing status: Weight bearing as tolerated with the left leg  -Starting three days after surgery, okay for daily dressing changes until wound/surgical incision site is dry. Dressing changes can be performed with simple Band-aids or gauze pads secured with tape/ace bandages. Once you no longer see drainage from your wounds on your dressings, it is okay to shower. Do not scrub vigorously, just let water run over wound/surgical sites. Additionally, at this point one no longer needs to change dressings daily. It is important that you do not soak the wound/incision site underwater, though. This includes baths, hot tubs, swimming pools, etc. Do not apply lotions or creams over the incision sites.  -Always look for signs of compartment syndrome: pain out of proportion to the injury, pain not controlled with pain medication, numbness in digits, changing of color of digits (paleness). If these signs occur return to ED immediately for reassessment.  -Always work on ankle, knee motion to decrease swelling.  -Ice (20 minutes on and off 1 hour) and elevate to reduce swelling and throbbing pain.  -Call the office or come to Emergency Room if signs of infection appear (hot, swollen, red, draining pus, fever)  -Take medications as prescribed.  -Wean off narcotics (percocet/norco) as soon as possible. Do not take tylenol if still taking narcotics.  -Follow up with the Orthopedic Trauma Team in their office on 9/29/23 at 8:20am. Call [phone]  to schedule/confirm or with any questions/concerns. Discharge Instructions for Trauma       What to do after you leave the hospital:    Please continue to use your Incentive Spirometer as directed. You can practice 10 deep breaths/hour while awake. Using the [address] [facility] [address] will promote the health of your lungs by taking slow, deep breaths in. It is also important in preventing pneumonia or a pneumothorax from developing.         For resources transitioning back to the community following your trauma, visit [email or web address]    General questions or concerns please call the Trauma and [address] [place] [address] at [phone]. If needed, the clinic fax number is [phone]. Trauma is a life-threatening condition. Your doctor will want to closely monitor you. Be sure to go to all of your appointments.
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
## 2023-09-14 NOTE — BRIEF OP NOTE
Brief Postoperative Note      Patient: Fer Frankel  YOB: 1945  MRN: [id number]    Date of Procedure: 9/14/2023    Pre-Op Diagnosis Codes:  Left intertrochanteric femur fracture    Post-Op Diagnosis:   Left intertrochanteric femur fracture       Procedure(s):  Open treatment left IT fracture with cephalomedullary nail    Surgeon(s):  Iron Nicolas DO    Assistant:  Resident: Belen Vo MD; Mark Tony DO    Anesthesia: General    Estimated Blood Loss (mL): 75 cc    Fluids: 1000 cc crystalloid    Complications: None    Specimens:   * No specimens in log *    Implants: 125 deg S&N Intertan nail; 360 x 10 mm  Implant Name Type Inv. Item Serial No.  Lot No. LRB No. Used Action   NAIL IM 10S L36CM [id number] 125DEG LT Earlis Dunk - [id number]  NAIL IM 10S L36CM [id number] 125DEG LT [facility] [employer]- [id number] Left 1 Implanted   KIT SCR LEG L80MM [id number] COMPR L75MM DIA7MM [employer]K - [id number]  KIT SCR LEG L80MM [id number] COMPR L75MM DIA7MM [facility]- [id number] Left 1 Implanted   SCREW BNE L27.5MM DIA5MM JOHN PAUL TI INT CAPT HEX LO PROF FOR - [id number]  SCREW BNE L27.5MM DIA5MM JOHN PAUL TI INT CAPT HEX LO PROF FOR  [facility]- [id number] Left 1 Implanted   SCREW BNE L32.5MM DIA5MM JOHN PAUL TIB G TI ST SELF DRL HEX DRV - [id number]  SCREW BNE L32.5MM DIA5MM JOHN PAUL TIB G TI ST SELF DRL HEX DRV  [employer] AND [employer]PH [employer]- [id number] Left 1 Implanted         Drains:   [REMOVED] External Urinary Catheter (Removed)   Site Assessment Clean,dry & intact 09/14/23 0400   Placement Repositioned 09/13/23 2000   Securement Method Other (Comment) 09/14/23 0400   Suction 40 mmgHg continuous 09/14/23 0400   Urine Color Erma 09/14/23 0400   Urine Appearance Clear 09/14/23 0400   Output (mL) 200 mL 09/14/23 0559       Findings: See op note.       Electronically signed by Mark Tony DO on 9/14/2023 at 9:57 AM

## 2023-09-14 NOTE — PLAN OF CARE
Problem: Discharge Planning  Goal: Discharge to home or other facility with appropriate resources  9/14/2023 1836 by Lorena Ramon RN  Outcome: Progressing  9/14/2023 0515 by Herb Oliva RN  Outcome: Progressing  Flowsheets (Taken 9/13/2023 2000)  Discharge to home or other facility with appropriate resources:   Identify barriers to discharge with patient and caregiver   Arrange for needed discharge resources and transportation as appropriate   Identify discharge learning needs (meds, wound care, etc)     Problem: Pain  Goal: Verbalizes/displays adequate comfort level or baseline comfort level  9/14/2023 1836 by Lorena Ramon RN  Outcome: Progressing  9/14/2023 0515 by Herb Oliva RN  Outcome: Progressing     Problem: ABCDS Injury Assessment  Goal: Absence of physical injury  9/14/2023 1836 by Lorena Ramon RN  Outcome: Progressing  9/14/2023 0515 by Herb Oliva RN  Outcome: Progressing     Problem: Skin/Tissue Integrity  Goal: Absence of new skin breakdown  Description: 1. Monitor for areas of redness and/or skin breakdown  2. Assess vascular access sites hourly  3. Every 4-6 hours minimum:  Change oxygen saturation probe site  4. Every 4-6 hours:  If on nasal continuous positive airway pressure, respiratory therapy assess nares and determine need for appliance change or resting period.   9/14/2023 1836 by Lorena Ramon RN  Outcome: Progressing  9/14/2023 0515 by Herb Oliva RN  Outcome: Progressing     Problem: Safety - Adult  Goal: Free from fall injury  9/14/2023 1836 by Lorena Ramon RN  Outcome: Progressing  9/14/2023 0515 by Herb Oliva RN  Outcome: Progressing     Problem: Chronic Conditions and Co-morbidities  Goal: Patient's chronic conditions and co-morbidity symptoms are monitored and maintained or improved  Outcome: Progressing

## 2023-09-14 NOTE — PLAN OF CARE
Orthopedic Surgery Post Operative Plan of Care Note:    Natalia Tai is a 68 y.o., female who is POD0 from:    Left Intertrochanteric Femur fracture s/p CMN     - Plan for OR tomorrow 9/15 for revision nailing  - Weight Bearing Status: NWB to the LLE  - Dressings: Adaptic gauze and tegaderm.  - Abx: Ancef 2g q8h for 24 hours. - Diet: Okay for diet from orthopedic surgery perspective, will be NPO at midnight for OR 9/15  - DVT ppx: Please hold chem AC for OR tomorrow. - Please contact Orthopedic Surgery Resident on call with questions or concerns.       Loy Ortiz MD  Orthopedic Surgery Resident, PGY-2  [address] [place], [place]

## 2023-09-14 NOTE — OP NOTE
bandages were applied. The patient was successfully extubated by the anesthesia providers with no known complications and transferred to the recovery room. Postoperative plan:  Postoperative imaging was obtained in standard fashion. Imaging was reviewed and demonstrated anterior cortical perforation of the distal femur. Patient will have 24 hours of prophylactic antibiotics. She will be non weight bearing to the left leg. We will discuss with the patient returning to the OR on 9/15 for revision intramedullary nail and ORIF of the distal femur.     Electronically signed by Gage Sims DO on 9/14/2023 at 9:59 AM
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
## 2023-09-14 NOTE — PLAN OF CARE
Problem: Discharge Planning  Goal: Discharge to home or other facility with appropriate resources  9/14/2023 0515 by Kortney Steel RN  Outcome: Progressing  Flowsheets (Taken 9/13/2023 2000)  Discharge to home or other facility with appropriate resources:   Identify barriers to discharge with patient and caregiver   Arrange for needed discharge resources and transportation as appropriate   Identify discharge learning needs (meds, wound care, etc)  9/13/2023 1845 by Edy Jorge RN  Outcome: Progressing     Problem: Pain  Goal: Verbalizes/displays adequate comfort level or baseline comfort level  9/14/2023 0515 by Kortney Steel RN  Outcome: Progressing  9/13/2023 1845 by Edy Jorge RN  Outcome: Progressing     Problem: ABCDS Injury Assessment  Goal: Absence of physical injury  9/14/2023 0515 by Kortney Steel RN  Outcome: Progressing  9/13/2023 1845 by Edy Jorge RN  Outcome: Progressing     Problem: Skin/Tissue Integrity  Goal: Absence of new skin breakdown  Description: 1. Monitor for areas of redness and/or skin breakdown  2. Assess vascular access sites hourly  3. Every 4-6 hours minimum:  Change oxygen saturation probe site  4. Every 4-6 hours:  If on nasal continuous positive airway pressure, respiratory therapy assess nares and determine need for appliance change or resting period.   9/14/2023 0515 by Kortney Steel RN  Outcome: Progressing  9/13/2023 1845 by Edy Jorge RN  Outcome: Progressing     Problem: Safety - Adult  Goal: Free from fall injury  9/14/2023 0515 by Kortney Steel RN  Outcome: Progressing  9/13/2023 1845 by Edy Jorge RN  Outcome: Progressing
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
## 2023-09-15 ENCOUNTER — APPOINTMENT (OUTPATIENT)
Age: 78
End: 2023-09-15
Payer: MEDICARE

## 2023-09-15 ENCOUNTER — ANESTHESIA (OUTPATIENT)
Dept: OPERATING ROOM | Age: 78
End: 2023-09-15
Payer: MEDICARE

## 2023-09-15 ENCOUNTER — APPOINTMENT (OUTPATIENT)
Dept: GENERAL RADIOLOGY | Age: 78
End: 2023-09-15
Payer: MEDICARE

## 2023-09-15 ENCOUNTER — ANESTHESIA EVENT (OUTPATIENT)
Dept: OPERATING ROOM | Age: 78
End: 2023-09-15
Payer: MEDICARE

## 2023-09-15 LAB
ANION GAP SERPL CALCULATED.3IONS-SCNC: 9 MMOL/L (ref 9–17)
BASOPHILS # BLD: 0 K/UL (ref 0–0.2)
BASOPHILS NFR BLD: 0 % (ref 0–2)
BUN SERPL-MCNC: 32 MG/DL (ref 8–23)
CA-I BLD-SCNC: 1.28 MMOL/L (ref 1.15–1.33)
CALCIUM SERPL-MCNC: 8.9 MG/DL (ref 8.6–10.4)
CHLORIDE BLD-SCNC: 97 MMOL/L (ref 98–107)
CHLORIDE SERPL-SCNC: 98 MMOL/L (ref 98–107)
CO2 BLD CALC-SCNC: 27 MMOL/L (ref 22–30)
CO2 SERPL-SCNC: 25 MMOL/L (ref 20–31)
CREAT SERPL-MCNC: 0.9 MG/DL (ref 0.5–0.9)
EKG ATRIAL RATE: 89 BPM
EKG P AXIS: 59 DEGREES
EKG P-R INTERVAL: 150 MS
EKG Q-T INTERVAL: 408 MS
EKG QRS DURATION: 78 MS
EKG QTC CALCULATION (BAZETT): 496 MS
EKG R AXIS: 39 DEGREES
EKG T AXIS: 69 DEGREES
EKG VENTRICULAR RATE: 89 BPM
EOSINOPHIL # BLD: 0 K/UL (ref 0–0.4)
EOSINOPHILS RELATIVE PERCENT: 0 % (ref 1–4)
ERYTHROCYTE [DISTWIDTH] IN BLOOD BY AUTOMATED COUNT: 17.5 % (ref 11.8–14.4)
GFR SERPL CREATININE-BSD FRML MDRD: >60 ML/MIN/1.73M2
GLUCOSE BLD-MCNC: 116 MG/DL (ref 65–105)
GLUCOSE BLD-MCNC: 138 MG/DL (ref 65–105)
GLUCOSE BLD-MCNC: 146 MG/DL (ref 65–105)
GLUCOSE BLD-MCNC: 151 MG/DL (ref 65–105)
GLUCOSE BLD-MCNC: 163 MG/DL (ref 65–105)
GLUCOSE BLD-MCNC: 183 MG/DL (ref 65–105)
GLUCOSE BLD-MCNC: 188 MG/DL (ref 65–105)
GLUCOSE BLD-MCNC: 203 MG/DL (ref 65–105)
GLUCOSE BLD-MCNC: 245 MG/DL (ref 65–105)
GLUCOSE BLD-MCNC: 255 MG/DL (ref 65–105)
GLUCOSE BLD-MCNC: 264 MG/DL (ref 65–105)
GLUCOSE BLD-MCNC: 278 MG/DL (ref 65–105)
GLUCOSE BLD-MCNC: 316 MG/DL (ref 65–105)
GLUCOSE BLD-MCNC: 349 MG/DL (ref 65–105)
GLUCOSE BLD-MCNC: 368 MG/DL (ref 65–105)
GLUCOSE BLD-MCNC: 489 MG/DL (ref 65–105)
GLUCOSE BLD-MCNC: 505 MG/DL (ref 65–105)
GLUCOSE BLD-MCNC: 555 MG/DL (ref 65–105)
GLUCOSE BLD-MCNC: 582 MG/DL (ref 65–105)
GLUCOSE BLD-MCNC: 588 MG/DL (ref 65–105)
GLUCOSE SERPL-MCNC: 154 MG/DL (ref 70–99)
HCT VFR BLD AUTO: 26.1 % (ref 36.3–47.1)
HGB BLD-MCNC: 7.4 G/DL (ref 11.9–15.1)
IMM GRANULOCYTES # BLD AUTO: 0 K/UL (ref 0–0.3)
IMM GRANULOCYTES NFR BLD: 0 %
LYMPHOCYTES NFR BLD: 0.27 K/UL (ref 1–4.8)
LYMPHOCYTES RELATIVE PERCENT: 2 % (ref 24–44)
MAGNESIUM SERPL-MCNC: 2.3 MG/DL (ref 1.6–2.6)
MCH RBC QN AUTO: 25.8 PG (ref 25.2–33.5)
MCHC RBC AUTO-ENTMCNC: 28.4 G/DL (ref 28.4–34.8)
MCV RBC AUTO: 90.9 FL (ref 82.6–102.9)
MONOCYTES NFR BLD: 0.54 K/UL (ref 0.1–0.8)
MONOCYTES NFR BLD: 4 % (ref 1–7)
MORPHOLOGY: ABNORMAL
NEUTROPHILS NFR BLD: 94 % (ref 36–66)
NEUTS SEG NFR BLD: 12.59 K/UL (ref 1.8–7.7)
NRBC BLD-RTO: 0 PER 100 WBC
PHOSPHATE SERPL-MCNC: 3.8 MG/DL (ref 2.6–4.5)
PLATELET # BLD AUTO: 149 K/UL (ref 138–453)
PMV BLD AUTO: 10.9 FL (ref 8.1–13.5)
POC ANION GAP: 9 MMOL/L (ref 7–16)
POTASSIUM BLD-SCNC: 4.4 MMOL/L (ref 3.5–4.5)
POTASSIUM SERPL-SCNC: 4.4 MMOL/L (ref 3.7–5.3)
RBC # BLD AUTO: 2.87 M/UL (ref 3.95–5.11)
SODIUM BLD-SCNC: 132 MMOL/L (ref 138–146)
SODIUM SERPL-SCNC: 132 MMOL/L (ref 135–144)
WBC OTHER # BLD: 13.4 K/UL (ref 3.5–11.3)

## 2023-09-15 PROCEDURE — 85025 COMPLETE CBC W/AUTO DIFF WBC: CPT

## 2023-09-15 PROCEDURE — 7100000000 HC PACU RECOVERY - FIRST 15 MIN: Performed by: ORTHOPAEDIC SURGERY

## 2023-09-15 PROCEDURE — 36415 COLL VENOUS BLD VENIPUNCTURE: CPT

## 2023-09-15 PROCEDURE — 3700000001 HC ADD 15 MINUTES (ANESTHESIA): Performed by: ORTHOPAEDIC SURGERY

## 2023-09-15 PROCEDURE — 6370000000 HC RX 637 (ALT 250 FOR IP): Performed by: STUDENT IN AN ORGANIZED HEALTH CARE EDUCATION/TRAINING PROGRAM

## 2023-09-15 PROCEDURE — 6370000000 HC RX 637 (ALT 250 FOR IP)

## 2023-09-15 PROCEDURE — 2720000010 HC SURG SUPPLY STERILE: Performed by: ORTHOPAEDIC SURGERY

## 2023-09-15 PROCEDURE — 20680 REMOVAL OF IMPLANT DEEP: CPT | Performed by: ORTHOPAEDIC SURGERY

## 2023-09-15 PROCEDURE — 2500000003 HC RX 250 WO HCPCS

## 2023-09-15 PROCEDURE — 73502 X-RAY EXAM HIP UNI 2-3 VIEWS: CPT

## 2023-09-15 PROCEDURE — 2709999900 HC NON-CHARGEABLE SUPPLY: Performed by: ORTHOPAEDIC SURGERY

## 2023-09-15 PROCEDURE — 84100 ASSAY OF PHOSPHORUS: CPT

## 2023-09-15 PROCEDURE — 83735 ASSAY OF MAGNESIUM: CPT

## 2023-09-15 PROCEDURE — 3600000015 HC SURGERY LEVEL 5 ADDTL 15MIN: Performed by: ORTHOPAEDIC SURGERY

## 2023-09-15 PROCEDURE — 6360000002 HC RX W HCPCS

## 2023-09-15 PROCEDURE — C9399 UNCLASSIFIED DRUGS OR BIOLOG: HCPCS

## 2023-09-15 PROCEDURE — 0QS706Z REPOSITION LEFT UPPER FEMUR WITH INTRAMEDULLARY INTERNAL FIXATION DEVICE, OPEN APPROACH: ICD-10-PCS | Performed by: ORTHOPAEDIC SURGERY

## 2023-09-15 PROCEDURE — 2580000003 HC RX 258: Performed by: STUDENT IN AN ORGANIZED HEALTH CARE EDUCATION/TRAINING PROGRAM

## 2023-09-15 PROCEDURE — 82330 ASSAY OF CALCIUM: CPT

## 2023-09-15 PROCEDURE — 2580000003 HC RX 258

## 2023-09-15 PROCEDURE — 3700000000 HC ANESTHESIA ATTENDED CARE: Performed by: ORTHOPAEDIC SURGERY

## 2023-09-15 PROCEDURE — 80048 BASIC METABOLIC PNL TOTAL CA: CPT

## 2023-09-15 PROCEDURE — 0QW704Z REVISION OF INTERNAL FIXATION DEVICE IN LEFT UPPER FEMUR, OPEN APPROACH: ICD-10-PCS | Performed by: ORTHOPAEDIC SURGERY

## 2023-09-15 PROCEDURE — 27511 TREATMENT OF THIGH FRACTURE: CPT | Performed by: ORTHOPAEDIC SURGERY

## 2023-09-15 PROCEDURE — 82947 ASSAY GLUCOSE BLOOD QUANT: CPT

## 2023-09-15 PROCEDURE — C1713 ANCHOR/SCREW BN/BN,TIS/BN: HCPCS | Performed by: ORTHOPAEDIC SURGERY

## 2023-09-15 PROCEDURE — 2580000003 HC RX 258: Performed by: ORTHOPAEDIC SURGERY

## 2023-09-15 PROCEDURE — 99232 SBSQ HOSP IP/OBS MODERATE 35: CPT | Performed by: STUDENT IN AN ORGANIZED HEALTH CARE EDUCATION/TRAINING PROGRAM

## 2023-09-15 PROCEDURE — 80051 ELECTROLYTE PANEL: CPT

## 2023-09-15 PROCEDURE — 1200000000 HC SEMI PRIVATE

## 2023-09-15 PROCEDURE — 73552 X-RAY EXAM OF FEMUR 2/>: CPT

## 2023-09-15 PROCEDURE — 27245 TREAT THIGH FRACTURE: CPT | Performed by: ORTHOPAEDIC SURGERY

## 2023-09-15 PROCEDURE — 96127 BRIEF EMOTIONAL/BEHAV ASSMT: CPT | Performed by: SOCIAL WORKER

## 2023-09-15 PROCEDURE — 7100000001 HC PACU RECOVERY - ADDTL 15 MIN: Performed by: ORTHOPAEDIC SURGERY

## 2023-09-15 PROCEDURE — 2580000003 HC RX 258: Performed by: NURSE PRACTITIONER

## 2023-09-15 PROCEDURE — 3600000005 HC SURGERY LEVEL 5 BASE: Performed by: ORTHOPAEDIC SURGERY

## 2023-09-15 DEVICE — TRIGEN LOW PROFILE SCREW 5.0MM X 25MM
Type: IMPLANTABLE DEVICE | Site: FEMUR | Status: FUNCTIONAL
Brand: TRIGEN

## 2023-09-15 DEVICE — EVOS 3.5MM X 36MM LOCKING SCREW SELF-TAPPING
Type: IMPLANTABLE DEVICE | Site: FEMUR | Status: FUNCTIONAL
Brand: EVOS

## 2023-09-15 DEVICE — TRIGEN INTERTAN 10S 10MM X 32CM 125DEGREE LEFT
Type: IMPLANTABLE DEVICE | Site: FEMUR | Status: FUNCTIONAL
Brand: TRIGEN

## 2023-09-15 DEVICE — EVOS 3.5MM X 28MM LOCKING SCREW SELF-TAPPING
Type: IMPLANTABLE DEVICE | Status: FUNCTIONAL
Brand: EVOS

## 2023-09-15 DEVICE — EVOS 3.5MM X 22MM CORTEX SCREW SELF-TAPPING
Type: IMPLANTABLE DEVICE | Site: FEMUR | Status: FUNCTIONAL
Brand: EVOS

## 2023-09-15 RX ORDER — SODIUM CHLORIDE, SODIUM LACTATE, POTASSIUM CHLORIDE, AND CALCIUM CHLORIDE .6; .31; .03; .02 G/100ML; G/100ML; G/100ML; G/100ML
1000 INJECTION, SOLUTION INTRAVENOUS ONCE
Status: COMPLETED | OUTPATIENT
Start: 2023-09-15 | End: 2023-09-15

## 2023-09-15 RX ORDER — SODIUM CHLORIDE 0.9 % (FLUSH) 0.9 %
5-40 SYRINGE (ML) INJECTION PRN
Status: DISCONTINUED | OUTPATIENT
Start: 2023-09-15 | End: 2023-09-15 | Stop reason: HOSPADM

## 2023-09-15 RX ORDER — MAGNESIUM HYDROXIDE 1200 MG/15ML
LIQUID ORAL CONTINUOUS PRN
Status: DISCONTINUED | OUTPATIENT
Start: 2023-09-15 | End: 2023-09-15 | Stop reason: HOSPADM

## 2023-09-15 RX ORDER — HYDRALAZINE HYDROCHLORIDE 20 MG/ML
10 INJECTION INTRAMUSCULAR; INTRAVENOUS
Status: DISCONTINUED | OUTPATIENT
Start: 2023-09-15 | End: 2023-09-15 | Stop reason: HOSPADM

## 2023-09-15 RX ORDER — MAGNESIUM HYDROXIDE 1200 MG/15ML
LIQUID ORAL PRN
Status: DISCONTINUED | OUTPATIENT
Start: 2023-09-15 | End: 2023-09-15 | Stop reason: ALTCHOICE

## 2023-09-15 RX ORDER — SODIUM CHLORIDE 9 MG/ML
INJECTION, SOLUTION INTRAVENOUS PRN
Status: DISCONTINUED | OUTPATIENT
Start: 2023-09-15 | End: 2023-09-15 | Stop reason: HOSPADM

## 2023-09-15 RX ORDER — DIPHENHYDRAMINE HYDROCHLORIDE 50 MG/ML
12.5 INJECTION INTRAMUSCULAR; INTRAVENOUS
Status: DISCONTINUED | OUTPATIENT
Start: 2023-09-15 | End: 2023-09-15 | Stop reason: HOSPADM

## 2023-09-15 RX ORDER — FENTANYL CITRATE 50 UG/ML
INJECTION, SOLUTION INTRAMUSCULAR; INTRAVENOUS PRN
Status: DISCONTINUED | OUTPATIENT
Start: 2023-09-15 | End: 2023-09-15 | Stop reason: SDUPTHER

## 2023-09-15 RX ORDER — ROCURONIUM BROMIDE 10 MG/ML
INJECTION, SOLUTION INTRAVENOUS PRN
Status: DISCONTINUED | OUTPATIENT
Start: 2023-09-15 | End: 2023-09-15 | Stop reason: SDUPTHER

## 2023-09-15 RX ORDER — PROPOFOL 10 MG/ML
INJECTION, EMULSION INTRAVENOUS PRN
Status: DISCONTINUED | OUTPATIENT
Start: 2023-09-15 | End: 2023-09-15 | Stop reason: SDUPTHER

## 2023-09-15 RX ORDER — DROPERIDOL 2.5 MG/ML
0.62 INJECTION, SOLUTION INTRAMUSCULAR; INTRAVENOUS
Status: DISCONTINUED | OUTPATIENT
Start: 2023-09-15 | End: 2023-09-15 | Stop reason: HOSPADM

## 2023-09-15 RX ORDER — CEFAZOLIN SODIUM 1 G/3ML
INJECTION, POWDER, FOR SOLUTION INTRAMUSCULAR; INTRAVENOUS PRN
Status: DISCONTINUED | OUTPATIENT
Start: 2023-09-15 | End: 2023-09-15 | Stop reason: SDUPTHER

## 2023-09-15 RX ORDER — SODIUM CHLORIDE 9 MG/ML
INJECTION, SOLUTION INTRAVENOUS PRN
Status: DISCONTINUED | OUTPATIENT
Start: 2023-09-15 | End: 2023-09-17

## 2023-09-15 RX ORDER — DEXTROSE MONOHYDRATE 100 MG/ML
INJECTION, SOLUTION INTRAVENOUS CONTINUOUS PRN
Status: DISCONTINUED | OUTPATIENT
Start: 2023-09-15 | End: 2023-09-19

## 2023-09-15 RX ORDER — SODIUM CHLORIDE, SODIUM LACTATE, POTASSIUM CHLORIDE, CALCIUM CHLORIDE 600; 310; 30; 20 MG/100ML; MG/100ML; MG/100ML; MG/100ML
INJECTION, SOLUTION INTRAVENOUS CONTINUOUS PRN
Status: DISCONTINUED | OUTPATIENT
Start: 2023-09-15 | End: 2023-09-15 | Stop reason: SDUPTHER

## 2023-09-15 RX ORDER — SODIUM CHLORIDE 0.9 % (FLUSH) 0.9 %
5-40 SYRINGE (ML) INJECTION EVERY 12 HOURS SCHEDULED
Status: DISCONTINUED | OUTPATIENT
Start: 2023-09-15 | End: 2023-09-15 | Stop reason: HOSPADM

## 2023-09-15 RX ORDER — LIDOCAINE HYDROCHLORIDE 10 MG/ML
INJECTION, SOLUTION EPIDURAL; INFILTRATION; INTRACAUDAL; PERINEURAL PRN
Status: DISCONTINUED | OUTPATIENT
Start: 2023-09-15 | End: 2023-09-15 | Stop reason: SDUPTHER

## 2023-09-15 RX ORDER — METOCLOPRAMIDE HYDROCHLORIDE 5 MG/ML
10 INJECTION INTRAMUSCULAR; INTRAVENOUS
Status: DISCONTINUED | OUTPATIENT
Start: 2023-09-15 | End: 2023-09-15 | Stop reason: HOSPADM

## 2023-09-15 RX ADMIN — FENTANYL CITRATE 25 MCG: 50 INJECTION, SOLUTION INTRAMUSCULAR; INTRAVENOUS at 08:07

## 2023-09-15 RX ADMIN — CEFAZOLIN 2 G: 1 INJECTION, POWDER, FOR SOLUTION INTRAMUSCULAR; INTRAVENOUS at 07:54

## 2023-09-15 RX ADMIN — SODIUM CHLORIDE 10.24 UNITS/HR: 9 INJECTION, SOLUTION INTRAVENOUS at 22:05

## 2023-09-15 RX ADMIN — FENTANYL CITRATE 50 MCG: 50 INJECTION, SOLUTION INTRAMUSCULAR; INTRAVENOUS at 08:00

## 2023-09-15 RX ADMIN — GABAPENTIN 300 MG: 300 CAPSULE ORAL at 13:37

## 2023-09-15 RX ADMIN — METHOCARBAMOL 750 MG: 750 TABLET ORAL at 02:02

## 2023-09-15 RX ADMIN — FENTANYL CITRATE 50 MCG: 50 INJECTION, SOLUTION INTRAMUSCULAR; INTRAVENOUS at 07:40

## 2023-09-15 RX ADMIN — ACETAMINOPHEN 1000 MG: 500 TABLET ORAL at 21:10

## 2023-09-15 RX ADMIN — FENTANYL CITRATE 50 MCG: 50 INJECTION, SOLUTION INTRAMUSCULAR; INTRAVENOUS at 08:47

## 2023-09-15 RX ADMIN — Medication 2000 MG: at 00:36

## 2023-09-15 RX ADMIN — SODIUM CHLORIDE, POTASSIUM CHLORIDE, SODIUM LACTATE AND CALCIUM CHLORIDE 1000 ML: 600; 310; 30; 20 INJECTION, SOLUTION INTRAVENOUS at 14:36

## 2023-09-15 RX ADMIN — PROPOFOL 100 MG: 10 INJECTION, EMULSION INTRAVENOUS at 07:40

## 2023-09-15 RX ADMIN — SUGAMMADEX 200 MG: 100 INJECTION, SOLUTION INTRAVENOUS at 10:10

## 2023-09-15 RX ADMIN — LIDOCAINE HYDROCHLORIDE 50 MG: 10 INJECTION, SOLUTION EPIDURAL; INFILTRATION; INTRACAUDAL; PERINEURAL at 07:40

## 2023-09-15 RX ADMIN — ROCURONIUM BROMIDE 50 MG: 10 INJECTION, SOLUTION INTRAVENOUS at 07:41

## 2023-09-15 RX ADMIN — SODIUM CHLORIDE 10.44 UNITS/HR: 9 INJECTION, SOLUTION INTRAVENOUS at 00:51

## 2023-09-15 RX ADMIN — SENNOSIDES AND DOCUSATE SODIUM 1 TABLET: 50; 8.6 TABLET ORAL at 21:10

## 2023-09-15 RX ADMIN — SODIUM CHLORIDE, POTASSIUM CHLORIDE, SODIUM LACTATE AND CALCIUM CHLORIDE: 600; 310; 30; 20 INJECTION, SOLUTION INTRAVENOUS at 07:31

## 2023-09-15 RX ADMIN — FENTANYL CITRATE 50 MCG: 50 INJECTION, SOLUTION INTRAMUSCULAR; INTRAVENOUS at 08:42

## 2023-09-15 RX ADMIN — FENTANYL CITRATE 25 MCG: 50 INJECTION, SOLUTION INTRAMUSCULAR; INTRAVENOUS at 08:04

## 2023-09-15 RX ADMIN — OXYCODONE HYDROCHLORIDE 5 MG: 5 TABLET ORAL at 23:19

## 2023-09-15 RX ADMIN — GABAPENTIN 300 MG: 300 CAPSULE ORAL at 21:10

## 2023-09-15 RX ADMIN — METHOCARBAMOL 750 MG: 750 TABLET ORAL at 14:32

## 2023-09-15 RX ADMIN — METHOCARBAMOL 750 MG: 750 TABLET ORAL at 21:10

## 2023-09-15 RX ADMIN — Medication 2000 MG: at 17:15

## 2023-09-15 RX ADMIN — ACETAMINOPHEN 1000 MG: 500 TABLET ORAL at 05:56

## 2023-09-15 RX ADMIN — ROCURONIUM BROMIDE 10 MG: 10 INJECTION, SOLUTION INTRAVENOUS at 09:17

## 2023-09-15 RX ADMIN — ROCURONIUM BROMIDE 10 MG: 10 INJECTION, SOLUTION INTRAVENOUS at 09:44

## 2023-09-15 RX ADMIN — ACETAMINOPHEN 1000 MG: 500 TABLET ORAL at 13:37

## 2023-09-15 RX ADMIN — ROCURONIUM BROMIDE 10 MG: 10 INJECTION, SOLUTION INTRAVENOUS at 08:57

## 2023-09-15 ASSESSMENT — PAIN SCALES - GENERAL
PAINLEVEL_OUTOF10: 2
PAINLEVEL_OUTOF10: 2
PAINLEVEL_OUTOF10: 0
PAINLEVEL_OUTOF10: 3
PAINLEVEL_OUTOF10: 5

## 2023-09-15 ASSESSMENT — PAIN DESCRIPTION - ORIENTATION
ORIENTATION: LEFT

## 2023-09-15 ASSESSMENT — PAIN - FUNCTIONAL ASSESSMENT
PAIN_FUNCTIONAL_ASSESSMENT: 0-10
PAIN_FUNCTIONAL_ASSESSMENT: PREVENTS OR INTERFERES SOME ACTIVE ACTIVITIES AND ADLS

## 2023-09-15 ASSESSMENT — PAIN DESCRIPTION - DESCRIPTORS
DESCRIPTORS: SORE

## 2023-09-15 ASSESSMENT — PAIN DESCRIPTION - LOCATION
LOCATION: LEG
LOCATION: KNEE
LOCATION: KNEE
LOCATION: LEG

## 2023-09-15 NOTE — PLAN OF CARE
Problem: Discharge Planning  Goal: Discharge to home or other facility with appropriate resources  9/15/2023 1643 by Jluis Kidd RN  Outcome: Progressing  9/15/2023 0638 by Sanket Fernando RN  Outcome: Progressing     Problem: Pain  Goal: Verbalizes/displays adequate comfort level or baseline comfort level  9/15/2023 1643 by Jluis Kidd RN  Outcome: Progressing  9/15/2023 0638 by Sanket Fernando RN  Outcome: Progressing     Problem: ABCDS Injury Assessment  Goal: Absence of physical injury  9/15/2023 1643 by Jluis Kidd RN  Outcome: Progressing  9/15/2023 0638 by Sanket Fernando RN  Outcome: Progressing     Problem: Skin/Tissue Integrity  Goal: Absence of new skin breakdown  Description: 1. Monitor for areas of redness and/or skin breakdown  2. Assess vascular access sites hourly  3. Every 4-6 hours minimum:  Change oxygen saturation probe site  4. Every 4-6 hours:  If on nasal continuous positive airway pressure, respiratory therapy assess nares and determine need for appliance change or resting period.   9/15/2023 1643 by Jluis Kidd RN  Outcome: Progressing  9/15/2023 0638 by Sanket Fernando RN  Outcome: Progressing     Problem: Safety - Adult  Goal: Free from fall injury  9/15/2023 1643 by Jluis Kidd RN  Outcome: Progressing  9/15/2023 0638 by Sanket Fernando RN  Outcome: Progressing  Flowsheets (Taken 9/14/2023 2022)  Free From Fall Injury:   Instruct family/caregiver on patient safety   Based on caregiver fall risk screen, instruct family/caregiver to ask for assistance with transferring infant if caregiver noted to have fall risk factors     Problem: Chronic Conditions and Co-morbidities  Goal: Patient's chronic conditions and co-morbidity symptoms are monitored and maintained or improved  9/15/2023 1643 by Jluis Kidd RN  Outcome: Progressing  9/15/2023 0638 by Sanket Fernando RN  Outcome: Progressing
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
## 2023-09-15 NOTE — PLAN OF CARE
Orthopedic Surgery Post Operative Plan of Care Note:    Frank Alexandre is a 68 y.o., female who is POD0 from:    Left Revision CMN and ORIF     - Weight Bearing Status: WBAT to the LLE  - Dressings: Adaptic, Gauze, Tegaderms  - Abx: Ancef 2g q8h for 24 hours  - Diet: Okay for adult diet per Ortho perspective. - DVT ppx: Okay for chemical anticoagulation POD1.  - Please contact Orthopedic Surgery Resident on call with questions or concerns.       Sha Katz MD  Orthopedic Surgery Resident, PGY-2  [address] [place], [place]

## 2023-09-15 NOTE — PLAN OF CARE
Problem: Discharge Planning  Goal: Discharge to home or other facility with appropriate resources  9/15/2023 0638 by Alex Shepard RN  Outcome: Progressing  9/14/2023 1836 by Jose Alfredo Ryan RN  Outcome: Progressing     Problem: Pain  Goal: Verbalizes/displays adequate comfort level or baseline comfort level  9/15/2023 0638 by Alex Shepard RN  Outcome: Progressing  9/14/2023 1836 by Jose Alfredo Ryan RN  Outcome: Progressing     Problem: ABCDS Injury Assessment  Goal: Absence of physical injury  9/15/2023 0638 by Alex Shepard RN  Outcome: Progressing  9/14/2023 1836 by Jose Alfredo Ryan RN  Outcome: Progressing     Problem: Skin/Tissue Integrity  Goal: Absence of new skin breakdown  Description: 1. Monitor for areas of redness and/or skin breakdown  2. Assess vascular access sites hourly  3. Every 4-6 hours minimum:  Change oxygen saturation probe site  4. Every 4-6 hours:  If on nasal continuous positive airway pressure, respiratory therapy assess nares and determine need for appliance change or resting period.   9/15/2023 0292 by Alex Shepard RN  Outcome: Progressing  9/14/2023 1836 by Jose Alfredo Ryan RN  Outcome: Progressing     Problem: Safety - Adult  Goal: Free from fall injury  9/15/2023 0638 by Alex Shepard RN  Outcome: Progressing  Flowsheets (Taken 9/14/2023 2022)  Free From Fall Injury:   Instruct family/caregiver on patient safety   Based on caregiver fall risk screen, instruct family/caregiver to ask for assistance with transferring infant if caregiver noted to have fall risk factors  9/14/2023 1836 by Jose Alfredo Ryan RN  Outcome: Progressing     Problem: Chronic Conditions and Co-morbidities  Goal: Patient's chronic conditions and co-morbidity symptoms are monitored and maintained or improved  9/15/2023 0638 by Alex Shepard RN  Outcome: Progressing  9/14/2023 1836 by Jose Alfredo Ryan RN  Outcome: Progressing     Problem: Pain  Goal: Verbalizes/displays adequate comfort level or baseline comfort level  9/15/2023 transferring infant if caregiver noted to have fall risk factors  9/14/2023 1836 by Chelsea Cadet RN  Outcome: Progressing     Problem: Chronic Conditions and Co-morbidities  Goal: Patient's chronic conditions and co-morbidity symptoms are monitored and maintained or improved  9/15/2023 0638 by Delfino Miranda RN  Outcome: Progressing  9/14/2023 1836 by Chelsea Cadet RN  Outcome: Progressing

## 2023-09-15 NOTE — BRIEF OP NOTE
Brief Postoperative Note      Patient: Serge Chong  YOB: 1945  MRN: [id number]    Date of Procedure: 9/15/2023    Pre-Op Diagnosis Codes:     * Trauma [T14.90XA]    Post-Op Diagnosis: Same       Procedure(s):  REVISION IM NAIL FEMUR, WITH OPEN REDUCTION AND INTERNAL FIXATION    Surgeon(s):  Zoya Rawls DO    Assistant:  Resident: Sergio Hughes MD; Jaswinder Blake DO; Nighat Reyes DO    Anesthesia: General    Estimated Blood Loss (mL): 100 cc    Fluids: 1 L Crystalloid    Complications: None    Specimens:   * No specimens in log *    Implants:  Implant Name Type Inv.  Item Serial No.  Lot No. LRB No. Used Action   SMITH AND NEPH  PLATE REF [id number]     [id number] Left 1 Implanted   NAIL IM 10S L32CM [id number] 125DEG LT ORTHOAPEDIC RECON - [id number]  NAIL IM 10S L32CM [id number] 125DEG LT [facility] ORTHOPAEDICS- [id number] Left 1 Implanted   NAIL IM 10S L36CM [id number] 125DEG LT Lawernce Slate - [id number]  NAIL IM 10S L36CM [id number] 125DEG LT ORTHOAPEDIC RECON  [employer] NEPHEW ORTHOPAEDICS- [id number] Left 1 Explanted   SCREW BNE L25MM DIA5MM JOHN PAUL TIB G TI ST SELF DRL - [id number]  SCREW BNE L25MM DIA5MM JOHN PAUL TIB G TI ST SELF DRL  [employer] AND NEPH ORTHOPAEDICS- [id number] Left 1 Implanted   SCREW BNE L27.5MM DIA5MM JOHN PAUL TI INT CAPT HEX LO PROF FOR - [id number]  SCREW BNE L27.5MM DIA5MM JOHN PAUL TI INT CAPT HEX LO PROF FOR  SM[employer] AND NEPH ORTHOPAEDICS- [id number] Left 1 Explanted   SCREW BNE L32.5MM DIA5MM JOHN PAUL TIB G TI ST SELF DRL HEX DRV - [id number]  SCREW BNE L32.5MM DIA5MM JOHN PAUL TIB G TI ST SELF DRL HEX DRV  [employer] AND NEPH ORTHOPAEDICS- [id number] Left 1 Explanted   SCREW BNE L25MM DIA5MM JOHN PAUL TIB G TI ST SELF DRL - [id number]  SCREW BNE L25MM DIA5MM JOHN PAUL TIB G TI ST SELF DRL  SMITH AND NEPHEW ORTHOPAEDICS-WD [id number] Left 1 Implanted   [facility] AND NEPH  4.5 CORTEX SCREW REF [id number]   [id number]   Left 1 Implanted   SMITH AND NEPHEW  4.5 CORTEX SCREW SIZE
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
## 2023-09-15 NOTE — CARE COORDINATION
Transitional Planning  Attempted to see patient, currently sleeping and did not wake up. Patient just returned from OR.
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
## 2023-09-16 ENCOUNTER — APPOINTMENT (OUTPATIENT)
Age: 78
End: 2023-09-16
Payer: MEDICARE

## 2023-09-16 LAB
ABO/RH: NORMAL
ANION GAP SERPL CALCULATED.3IONS-SCNC: 9 MMOL/L (ref 9–17)
ANTIBODY SCREEN: NEGATIVE
ARM BAND NUMBER: NORMAL
BASOPHILS # BLD: 0 K/UL (ref 0–0.2)
BASOPHILS NFR BLD: 0 % (ref 0–2)
BLOOD BANK DISPENSE STATUS: NORMAL
BLOOD BANK SAMPLE EXPIRATION: NORMAL
BPU ID: NORMAL
BUN SERPL-MCNC: 27 MG/DL (ref 8–23)
CALCIUM SERPL-MCNC: 8.2 MG/DL (ref 8.6–10.4)
CHLORIDE SERPL-SCNC: 98 MMOL/L (ref 98–107)
CO2 SERPL-SCNC: 22 MMOL/L (ref 20–31)
COMPONENT: NORMAL
CREAT SERPL-MCNC: 0.7 MG/DL (ref 0.5–0.9)
CROSSMATCH RESULT: NORMAL
ECHO AO ROOT DIAM: 3.3 CM
ECHO AO ROOT INDEX: 2.26 CM/M2
ECHO AV AREA PEAK VELOCITY: 2.6 CM2
ECHO AV AREA VTI: 2.7 CM2
ECHO AV AREA/BSA PEAK VELOCITY: 1.8 CM2/M2
ECHO AV AREA/BSA VTI: 1.8 CM2/M2
ECHO AV MEAN GRADIENT: 7 MMHG
ECHO AV MEAN VELOCITY: 1.2 M/S
ECHO AV PEAK GRADIENT: 10 MMHG
ECHO AV PEAK VELOCITY: 1.6 M/S
ECHO AV VELOCITY RATIO: 0.75
ECHO AV VTI: 35.9 CM
ECHO BSA: 1.47 M2
ECHO IVC PROX: 0.9 CM
ECHO LA AREA 2C: 10.6 CM2
ECHO LA AREA 4C: 12.1 CM2
ECHO LA DIAMETER INDEX: 2.12 CM/M2
ECHO LA DIAMETER: 3.1 CM
ECHO LA MAJOR AXIS: 4.5 CM
ECHO LA MINOR AXIS: 3.7 CM
ECHO LA TO AORTIC ROOT RATIO: 0.94
ECHO LA VOL 2C: 26 ML (ref 22–52)
ECHO LA VOL 4C: 26 ML (ref 22–52)
ECHO LA VOL BP: 28 ML (ref 22–52)
ECHO LA VOL/BSA BIPLANE: 19 ML/M2 (ref 16–34)
ECHO LA VOLUME INDEX A2C: 18 ML/M2 (ref 16–34)
ECHO LA VOLUME INDEX A4C: 18 ML/M2 (ref 16–34)
ECHO LV E' LATERAL VELOCITY: 8 CM/S
ECHO LV E' SEPTAL VELOCITY: 7 CM/S
ECHO LV EDV A2C: 48 ML
ECHO LV EDV A4C: 58 ML
ECHO LV EDV INDEX A4C: 40 ML/M2
ECHO LV EDV NDEX A2C: 33 ML/M2
ECHO LV EJECTION FRACTION A2C: 72 %
ECHO LV EJECTION FRACTION A4C: 68 %
ECHO LV EJECTION FRACTION BIPLANE: 69 % (ref 55–100)
ECHO LV ESV A2C: 13 ML
ECHO LV ESV A4C: 19 ML
ECHO LV ESV INDEX A2C: 9 ML/M2
ECHO LV ESV INDEX A4C: 13 ML/M2
ECHO LV INTERNAL DIMENSION DIASTOLE INDEX: 3.01 CM/M2
ECHO LV INTERNAL DIMENSION DIASTOLIC: 4.4 CM (ref 3.9–5.3)
ECHO LV IVSD: 0.9 CM (ref 0.6–0.9)
ECHO LV MASS 2D: 137.8 G (ref 67–162)
ECHO LV MASS INDEX 2D: 94.4 G/M2 (ref 43–95)
ECHO LV POSTERIOR WALL DIASTOLIC: 1 CM (ref 0.6–0.9)
ECHO LV RELATIVE WALL THICKNESS RATIO: 0.45
ECHO LVOT AREA: 3.5 CM2
ECHO LVOT AV VTI INDEX: 0.79
ECHO LVOT DIAM: 2.1 CM
ECHO LVOT MEAN GRADIENT: 4 MMHG
ECHO LVOT PEAK GRADIENT: 5 MMHG
ECHO LVOT PEAK VELOCITY: 1.2 M/S
ECHO LVOT STROKE VOLUME INDEX: 66.9 ML/M2
ECHO LVOT SV: 97.6 ML
ECHO LVOT VTI: 28.2 CM
ECHO MV A VELOCITY: 1.15 M/S
ECHO MV AREA VTI: 3.9 CM2
ECHO MV E DECELERATION TIME (DT): 150 MS
ECHO MV E VELOCITY: 0.74 M/S
ECHO MV E/A RATIO: 0.64
ECHO MV E/E' LATERAL: 9.25
ECHO MV E/E' RATIO (AVERAGED): 9.91
ECHO MV E/E' SEPTAL: 10.57
ECHO MV LVOT VTI INDEX: 0.89
ECHO MV MAX VELOCITY: 1.4 M/S
ECHO MV MEAN GRADIENT: 3 MMHG
ECHO MV MEAN VELOCITY: 0.8 M/S
ECHO MV PEAK GRADIENT: 8 MMHG
ECHO MV VTI: 25.2 CM
ECHO PV MAX VELOCITY: 1.3 M/S
ECHO PV PEAK GRADIENT: 6 MMHG
ECHO RV BASAL DIMENSION: 2.8 CM
ECHO RV INTERNAL DIMENSION: 1.9 CM
ECHO RV MID DIMENSION: 2 CM
ECHO RV TAPSE: 1.7 CM (ref 1.7–?)
ECHO TV REGURGITANT MAX VELOCITY: 1.7 M/S
ECHO TV REGURGITANT PEAK GRADIENT: 12 MMHG
EOSINOPHIL # BLD: 0 K/UL (ref 0–0.4)
EOSINOPHILS RELATIVE PERCENT: 0 % (ref 1–4)
ERYTHROCYTE [DISTWIDTH] IN BLOOD BY AUTOMATED COUNT: 18.2 % (ref 11.8–14.4)
GFR SERPL CREATININE-BSD FRML MDRD: >60 ML/MIN/1.73M2
GLUCOSE BLD-MCNC: 103 MG/DL (ref 65–105)
GLUCOSE BLD-MCNC: 124 MG/DL (ref 65–105)
GLUCOSE BLD-MCNC: 132 MG/DL (ref 65–105)
GLUCOSE BLD-MCNC: 145 MG/DL (ref 65–105)
GLUCOSE BLD-MCNC: 158 MG/DL (ref 65–105)
GLUCOSE BLD-MCNC: 164 MG/DL (ref 65–105)
GLUCOSE BLD-MCNC: 169 MG/DL (ref 65–105)
GLUCOSE BLD-MCNC: 173 MG/DL (ref 65–105)
GLUCOSE BLD-MCNC: 173 MG/DL (ref 65–105)
GLUCOSE BLD-MCNC: 245 MG/DL (ref 65–105)
GLUCOSE BLD-MCNC: 271 MG/DL (ref 65–105)
GLUCOSE BLD-MCNC: 316 MG/DL (ref 65–105)
GLUCOSE BLD-MCNC: 413 MG/DL (ref 65–105)
GLUCOSE BLD-MCNC: 424 MG/DL (ref 65–105)
GLUCOSE BLD-MCNC: 96 MG/DL (ref 65–105)
GLUCOSE SERPL-MCNC: 161 MG/DL (ref 70–99)
HCT VFR BLD AUTO: 26.3 % (ref 36.3–47.1)
HGB BLD-MCNC: 7.2 G/DL (ref 11.9–15.1)
IMM GRANULOCYTES # BLD AUTO: 0 K/UL (ref 0–0.3)
IMM GRANULOCYTES NFR BLD: 0 %
LYMPHOCYTES NFR BLD: 0.66 K/UL (ref 1–4.8)
LYMPHOCYTES RELATIVE PERCENT: 8 % (ref 24–44)
MCH RBC QN AUTO: 26.9 PG (ref 25.2–33.5)
MCHC RBC AUTO-ENTMCNC: 27.4 G/DL (ref 28.4–34.8)
MCV RBC AUTO: 98.1 FL (ref 82.6–102.9)
MONOCYTES NFR BLD: 0.58 K/UL (ref 0.1–0.8)
MONOCYTES NFR BLD: 7 % (ref 1–7)
MORPHOLOGY: ABNORMAL
NEUTROPHILS NFR BLD: 85 % (ref 36–66)
NEUTS SEG NFR BLD: 7.06 K/UL (ref 1.8–7.7)
NRBC BLD-RTO: 0.2 PER 100 WBC
PLATELET # BLD AUTO: 137 K/UL (ref 138–453)
PMV BLD AUTO: 11.1 FL (ref 8.1–13.5)
POTASSIUM SERPL-SCNC: 4.9 MMOL/L (ref 3.7–5.3)
RBC # BLD AUTO: 2.68 M/UL (ref 3.95–5.11)
SODIUM SERPL-SCNC: 129 MMOL/L (ref 135–144)
TRANSFUSION STATUS: NORMAL
UNIT DIVISION: 0
WBC OTHER # BLD: 8.3 K/UL (ref 3.5–11.3)

## 2023-09-16 PROCEDURE — 97110 THERAPEUTIC EXERCISES: CPT

## 2023-09-16 PROCEDURE — 6360000002 HC RX W HCPCS

## 2023-09-16 PROCEDURE — 6370000000 HC RX 637 (ALT 250 FOR IP)

## 2023-09-16 PROCEDURE — 93306 TTE W/DOPPLER COMPLETE: CPT

## 2023-09-16 PROCEDURE — 97116 GAIT TRAINING THERAPY: CPT

## 2023-09-16 PROCEDURE — 6370000000 HC RX 637 (ALT 250 FOR IP): Performed by: STUDENT IN AN ORGANIZED HEALTH CARE EDUCATION/TRAINING PROGRAM

## 2023-09-16 PROCEDURE — 36415 COLL VENOUS BLD VENIPUNCTURE: CPT

## 2023-09-16 PROCEDURE — 97535 SELF CARE MNGMENT TRAINING: CPT

## 2023-09-16 PROCEDURE — 93306 TTE W/DOPPLER COMPLETE: CPT | Performed by: INTERNAL MEDICINE

## 2023-09-16 PROCEDURE — 82947 ASSAY GLUCOSE BLOOD QUANT: CPT

## 2023-09-16 PROCEDURE — 97530 THERAPEUTIC ACTIVITIES: CPT

## 2023-09-16 PROCEDURE — 1200000000 HC SEMI PRIVATE

## 2023-09-16 PROCEDURE — 97163 PT EVAL HIGH COMPLEX 45 MIN: CPT

## 2023-09-16 PROCEDURE — 85025 COMPLETE CBC W/AUTO DIFF WBC: CPT

## 2023-09-16 PROCEDURE — 97166 OT EVAL MOD COMPLEX 45 MIN: CPT

## 2023-09-16 PROCEDURE — 99232 SBSQ HOSP IP/OBS MODERATE 35: CPT | Performed by: STUDENT IN AN ORGANIZED HEALTH CARE EDUCATION/TRAINING PROGRAM

## 2023-09-16 PROCEDURE — 80048 BASIC METABOLIC PNL TOTAL CA: CPT

## 2023-09-16 RX ORDER — ALOGLIPTIN 12.5 MG/1
12.5 TABLET, FILM COATED ORAL DAILY
Status: DISCONTINUED | OUTPATIENT
Start: 2023-09-16 | End: 2023-09-21 | Stop reason: HOSPADM

## 2023-09-16 RX ORDER — INSULIN LISPRO 100 [IU]/ML
0-4 INJECTION, SOLUTION INTRAVENOUS; SUBCUTANEOUS NIGHTLY
Status: DISCONTINUED | OUTPATIENT
Start: 2023-09-16 | End: 2023-09-16

## 2023-09-16 RX ORDER — INSULIN LISPRO 100 [IU]/ML
12 INJECTION, SOLUTION INTRAVENOUS; SUBCUTANEOUS
Status: DISCONTINUED | OUTPATIENT
Start: 2023-09-16 | End: 2023-09-19

## 2023-09-16 RX ORDER — INSULIN LISPRO 100 [IU]/ML
0-8 INJECTION, SOLUTION INTRAVENOUS; SUBCUTANEOUS
Status: DISCONTINUED | OUTPATIENT
Start: 2023-09-16 | End: 2023-09-16

## 2023-09-16 RX ORDER — FUROSEMIDE 40 MG/1
20 TABLET ORAL 2 TIMES DAILY
Status: DISCONTINUED | OUTPATIENT
Start: 2023-09-16 | End: 2023-09-21 | Stop reason: HOSPADM

## 2023-09-16 RX ORDER — INSULIN GLARGINE 100 [IU]/ML
15 INJECTION, SOLUTION SUBCUTANEOUS 2 TIMES DAILY
Status: DISCONTINUED | OUTPATIENT
Start: 2023-09-16 | End: 2023-09-16

## 2023-09-16 RX ORDER — INSULIN LISPRO 100 [IU]/ML
0-16 INJECTION, SOLUTION INTRAVENOUS; SUBCUTANEOUS
Status: DISCONTINUED | OUTPATIENT
Start: 2023-09-16 | End: 2023-09-18

## 2023-09-16 RX ORDER — INSULIN LISPRO 100 [IU]/ML
0-4 INJECTION, SOLUTION INTRAVENOUS; SUBCUTANEOUS NIGHTLY
Status: DISCONTINUED | OUTPATIENT
Start: 2023-09-16 | End: 2023-09-18

## 2023-09-16 RX ORDER — INSULIN LISPRO 100 [IU]/ML
0-16 INJECTION, SOLUTION INTRAVENOUS; SUBCUTANEOUS
Status: DISCONTINUED | OUTPATIENT
Start: 2023-09-16 | End: 2023-09-16

## 2023-09-16 RX ORDER — VITAMIN B COMPLEX
1000 TABLET ORAL DAILY
Status: DISCONTINUED | OUTPATIENT
Start: 2023-09-16 | End: 2023-09-21 | Stop reason: HOSPADM

## 2023-09-16 RX ORDER — INSULIN GLARGINE 100 [IU]/ML
30 INJECTION, SOLUTION SUBCUTANEOUS 2 TIMES DAILY
Status: DISCONTINUED | OUTPATIENT
Start: 2023-09-16 | End: 2023-09-18

## 2023-09-16 RX ORDER — SPIRONOLACTONE 25 MG/1
50 TABLET ORAL DAILY
Status: DISCONTINUED | OUTPATIENT
Start: 2023-09-16 | End: 2023-09-21 | Stop reason: HOSPADM

## 2023-09-16 RX ADMIN — ACETAMINOPHEN 1000 MG: 500 TABLET ORAL at 22:18

## 2023-09-16 RX ADMIN — Medication 1000 UNITS: at 12:14

## 2023-09-16 RX ADMIN — INSULIN LISPRO 4 UNITS: 100 INJECTION, SOLUTION INTRAVENOUS; SUBCUTANEOUS at 12:42

## 2023-09-16 RX ADMIN — SENNOSIDES AND DOCUSATE SODIUM 1 TABLET: 50; 8.6 TABLET ORAL at 19:57

## 2023-09-16 RX ADMIN — INSULIN LISPRO 14 UNITS: 100 INJECTION, SOLUTION INTRAVENOUS; SUBCUTANEOUS at 19:35

## 2023-09-16 RX ADMIN — INSULIN LISPRO 8 UNITS: 100 INJECTION, SOLUTION INTRAVENOUS; SUBCUTANEOUS at 17:39

## 2023-09-16 RX ADMIN — FUROSEMIDE 20 MG: 40 TABLET ORAL at 17:27

## 2023-09-16 RX ADMIN — SENNOSIDES AND DOCUSATE SODIUM 1 TABLET: 50; 8.6 TABLET ORAL at 08:55

## 2023-09-16 RX ADMIN — SPIRONOLACTONE 50 MG: 25 TABLET ORAL at 12:14

## 2023-09-16 RX ADMIN — METHOCARBAMOL 750 MG: 750 TABLET ORAL at 12:48

## 2023-09-16 RX ADMIN — METHOCARBAMOL 750 MG: 750 TABLET ORAL at 19:56

## 2023-09-16 RX ADMIN — ALOGLIPTIN 12.5 MG: 12.5 TABLET, FILM COATED ORAL at 12:14

## 2023-09-16 RX ADMIN — INSULIN GLARGINE 15 UNITS: 100 INJECTION, SOLUTION SUBCUTANEOUS at 12:16

## 2023-09-16 RX ADMIN — ACETAMINOPHEN 1000 MG: 500 TABLET ORAL at 06:03

## 2023-09-16 RX ADMIN — METHOCARBAMOL 750 MG: 750 TABLET ORAL at 06:02

## 2023-09-16 RX ADMIN — FUROSEMIDE 20 MG: 40 TABLET ORAL at 12:14

## 2023-09-16 RX ADMIN — GABAPENTIN 300 MG: 300 CAPSULE ORAL at 08:55

## 2023-09-16 RX ADMIN — POLYETHYLENE GLYCOL 3350 17 G: 17 POWDER, FOR SOLUTION ORAL at 08:55

## 2023-09-16 RX ADMIN — OXYCODONE HYDROCHLORIDE 5 MG: 5 TABLET ORAL at 19:57

## 2023-09-16 RX ADMIN — INSULIN LISPRO 12 UNITS: 100 INJECTION, SOLUTION INTRAVENOUS; SUBCUTANEOUS at 19:34

## 2023-09-16 RX ADMIN — ACETAMINOPHEN 1000 MG: 500 TABLET ORAL at 14:43

## 2023-09-16 RX ADMIN — Medication 2000 MG: at 00:58

## 2023-09-16 RX ADMIN — GABAPENTIN 300 MG: 300 CAPSULE ORAL at 19:56

## 2023-09-16 RX ADMIN — OXYCODONE HYDROCHLORIDE 5 MG: 5 TABLET ORAL at 10:48

## 2023-09-16 RX ADMIN — INSULIN GLARGINE 30 UNITS: 100 INJECTION, SOLUTION SUBCUTANEOUS at 21:45

## 2023-09-16 RX ADMIN — GABAPENTIN 300 MG: 300 CAPSULE ORAL at 14:44

## 2023-09-16 ASSESSMENT — PAIN SCALES - GENERAL
PAINLEVEL_OUTOF10: 5
PAINLEVEL_OUTOF10: 4
PAINLEVEL_OUTOF10: 6

## 2023-09-16 ASSESSMENT — PAIN DESCRIPTION - DESCRIPTORS
DESCRIPTORS: THROBBING
DESCRIPTORS: ACHING;DISCOMFORT
DESCRIPTORS: ACHING;DISCOMFORT
DESCRIPTORS: SORE
DESCRIPTORS: ACHING

## 2023-09-16 ASSESSMENT — PAIN DESCRIPTION - ORIENTATION
ORIENTATION: LEFT

## 2023-09-16 ASSESSMENT — PAIN DESCRIPTION - LOCATION
LOCATION: LEG;KNEE
LOCATION: LEG
LOCATION: LEG;KNEE
LOCATION: LEG
LOCATION: HIP

## 2023-09-16 NOTE — PROGRESS NOTES
abdomen pelvis: 1. No acute visceral injury. 2. Cirrhotic liver with small volume ascites and small gastrohepatic and splenic varices along with some esophageal varices. Mild splenomegaly. 3. Acute impacted comminuted left intertrochanteric fracture. 4. Old bilateral rib fractures. Thoracic and lumbar spine: 1. Mild diffuse degenerative changes. 2. No acute fracture. CT THORACIC SPINE BONY RECONSTRUCTION    Result Date: 9/13/2023  Chest abdomen pelvis: 1. No acute visceral injury. 2. Cirrhotic liver with small volume ascites and small gastrohepatic and splenic varices along with some esophageal varices. Mild splenomegaly. 3. Acute impacted comminuted left intertrochanteric fracture. 4. Old bilateral rib fractures. Thoracic and lumbar spine: 1. Mild diffuse degenerative changes. 2. No acute fracture. CT LUMBAR SPINE BONY RECONSTRUCTION    Result Date: 9/13/2023  Chest abdomen pelvis: 1. No acute visceral injury. 2. Cirrhotic liver with small volume ascites and small gastrohepatic and splenic varices along with some esophageal varices. Mild splenomegaly. 3. Acute impacted comminuted left intertrochanteric fracture. 4. Old bilateral rib fractures. Thoracic and lumbar spine: 1. Mild diffuse degenerative changes. 2. No acute fracture. CT HEAD WO CONTRAST    Result Date: 9/13/2023  CT HEAD: No CT evidence for acute intracranial abnormality. . Mild cerebral atrophy. CT CERVICAL SPINE: No acute fracture or subluxation of the cervical spine. Mild diffuse degenerative changes. CT CERVICAL SPINE WO CONTRAST    Result Date: 9/13/2023  CT HEAD: No CT evidence for acute intracranial abnormality. . Mild cerebral atrophy. CT CERVICAL SPINE: No acute fracture or subluxation of the cervical spine. Mild diffuse degenerative changes. XR HIP 2-3 VW W PELVIS LEFT    Result Date: 9/13/2023  Comminuted, displaced intertrochanteric left femur fracture.      XR CHEST PORTABLE    Result Date: 9/13/2023  No acute Dietary consult to help optimize nutrition  Needs aggressive PTOT- Recommend turning patient per protocol to avoid wounds. Maintain fall precautions. Monitor for urinary retention. Cirrhosis-resume Aldactone and Lasix home dose  Type 2 diabetes mellitus-A1c more than 12.2, patient will need insulin, switch insulin drip to Lantus 15 units twice daily, start medium dose sliding scale, check blood sugars with meals and at bedtime, will adjust insulin depending on blood sugars. Consult diabetes education. Discussed with trauma.   Ortho following for hip fracture  Monitor hb and platelet count  Replacement for vit d  Dexa scan outpatient  Will continue to monitor  Patient lives alone at home, discharge planning per primary    Lara Dixon MD  9/16/2023  10:55 AM
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
## 2023-09-16 NOTE — PROGRESS NOTES
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 Objective       Safety Devices  Type of Devices: Call light within reach; Left in bed;Bed alarm in place;Gait belt;Nurse notified  Restraints  Restraints Initially in Place: No    Balance  Sitting: With support (SUP for static sitting and ADLs completed at tray table EOB. ~15 minutes with no LOB. Expected to require increased assist for high/low planes. Pt BP checked upon sitting and 125/54. Did not report any dizziness.)    Standing: With support (Pt stood static for ~2 min in prep for mobility and BP assessment. BP taken standing and was 132/55. Denies any dizziness. Use of RW.)    Gait  Overall Level of Assistance: Contact-guard assistance; Additional time;Assist X1 (Pt completed functional side steps along EOB and ~3 feet forward and ~3 feet back. Use of RW. Pt resistive to completing a lot of activity d/t having near syncopal episode with PT earlier.)     AROM: Within functional limits  Strength: Within functional limits (B UE grossly 5/5)  Coordination: Within functional limits  Tone: Normal  Sensation: Intact (Pt denies any numbness/tignling)    ADL  Feeding: Modified independent ;Setup  Grooming: Modified independent ;Setup  Grooming Skilled Clinical Factors: Pt completed oral hygiene seated with tray table in front of pt. Did not complete standing d/t poor standing tolerance, pain and fatigue. UE Bathing: Stand by assistance;Setup; Increased time to complete  LE Bathing:  Moderate 37.26 (09/16/23 1438)  ADL Inpatient CMS 0-100% Score: 50.11 (09/16/23 1438)  ADL Inpatient CMS G-Code Modifier : CK (09/16/23 1438)      Goals  Short Term Goals  Time Frame for Short Term Goals: By discharge, pt will:  Short Term Goal 1: Demo bed mobility sit<>supine with SBA to promote increased engagement in ADLs  Short Term Goal 2: Demo functional sit<>stand transfers and functional mobility wiht SUP and use of LRAD PRN  Short Term Goal 3: Demo 8 minutes of dynamic standing tolerance with SBA to promote increased independence throughout ADLs  Short Term Goal 4: Demo UB ADLs with Mod IND  Short Term Goal 5: Demo LB ADLs/toileting with SBA, setup and use of DME PRN       Therapy Time   Individual Concurrent Group Co-treatment   Time In 1250         Time Out 1330         Minutes 40         Timed Code Treatment Minutes: 30 Minutes       Cheryl Paniagua, OTR/L
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
## 2023-09-16 NOTE — PLAN OF CARE
Problem: Discharge Planning  Goal: Discharge to home or other facility with appropriate resources  9/16/2023 0611 by Dimitrios Tran RN  Outcome: Progressing  9/15/2023 1643 by Zach Villarreal RN  Outcome: Progressing     Problem: Pain  Goal: Verbalizes/displays adequate comfort level or baseline comfort level  9/16/2023 0611 by Dimitrios Tran RN  Outcome: Progressing  9/15/2023 1643 by Zach Villarreal RN  Outcome: Progressing     Problem: ABCDS Injury Assessment  Goal: Absence of physical injury  9/16/2023 0611 by Dimitrios Tran RN  Outcome: Progressing  9/15/2023 1643 by Zach Villarreal RN  Outcome: Progressing     Problem: Skin/Tissue Integrity  Goal: Absence of new skin breakdown  Description: 1. Monitor for areas of redness and/or skin breakdown  2. Assess vascular access sites hourly  3. Every 4-6 hours minimum:  Change oxygen saturation probe site  4. Every 4-6 hours:  If on nasal continuous positive airway pressure, respiratory therapy assess nares and determine need for appliance change or resting period.   9/16/2023 9504 by Dimitrios Tran RN  Outcome: Progressing  9/15/2023 1643 by Zach Villarreal RN  Outcome: Progressing     Problem: Safety - Adult  Goal: Free from fall injury  9/16/2023 0611 by Dimitrios Tran RN  Outcome: Progressing  9/15/2023 1643 by Zach Villarreal RN  Outcome: Progressing     Problem: Chronic Conditions and Co-morbidities  Goal: Patient's chronic conditions and co-morbidity symptoms are monitored and maintained or improved  9/16/2023 0611 by Dimitrios Tran RN  Outcome: Progressing  9/15/2023 1643 by Zach Villarreal RN  Outcome: Progressing

## 2023-09-16 NOTE — PLAN OF CARE
Problem: Discharge Planning  Goal: Discharge to home or other facility with appropriate resources  9/16/2023 1500 by Rajesh Chavira RN  Outcome: Progressing  9/16/2023 0611 by Negin Mccollum RN  Outcome: Progressing     Problem: Pain  Goal: Verbalizes/displays adequate comfort level or baseline comfort level  9/16/2023 1500 by Rajesh Chavira RN  Outcome: Progressing  9/16/2023 0611 by Negin Mccollum RN  Outcome: Progressing     Problem: ABCDS Injury Assessment  Goal: Absence of physical injury  9/16/2023 1500 by Rajesh Chavira RN  Outcome: Progressing  9/16/2023 0611 by Negin Mccollum RN  Outcome: Progressing     Problem: Skin/Tissue Integrity  Goal: Absence of new skin breakdown  Description: 1. Monitor for areas of redness and/or skin breakdown  2. Assess vascular access sites hourly  3. Every 4-6 hours minimum:  Change oxygen saturation probe site  4. Every 4-6 hours:  If on nasal continuous positive airway pressure, respiratory therapy assess nares and determine need for appliance change or resting period.   9/16/2023 1500 by Rajesh Chavira RN  Outcome: Progressing  9/16/2023 0611 by Negin Mccollum RN  Outcome: Progressing     Problem: Safety - Adult  Goal: Free from fall injury  9/16/2023 1500 by Rajesh Chavira RN  Outcome: Progressing  9/16/2023 0611 by Negin Mccollum RN  Outcome: Progressing     Problem: Chronic Conditions and Co-morbidities  Goal: Patient's chronic conditions and co-morbidity symptoms are monitored and maintained or improved  9/16/2023 1500 by Rajesh Chavira RN  Outcome: Progressing  9/16/2023 0611 by Negin Mccollum RN  Outcome: Progressing

## 2023-09-17 PROBLEM — D64.9 ANEMIA: Status: ACTIVE | Noted: 2023-09-17

## 2023-09-17 LAB
ANION GAP SERPL CALCULATED.3IONS-SCNC: 10 MMOL/L (ref 9–17)
BASOPHILS # BLD: 0 K/UL (ref 0–0.2)
BASOPHILS NFR BLD: 0 % (ref 0–2)
BUN SERPL-MCNC: 27 MG/DL (ref 8–23)
CALCIUM SERPL-MCNC: 8.6 MG/DL (ref 8.6–10.4)
CHLORIDE SERPL-SCNC: 95 MMOL/L (ref 98–107)
CO2 SERPL-SCNC: 21 MMOL/L (ref 20–31)
CREAT SERPL-MCNC: 0.6 MG/DL (ref 0.5–0.9)
EOSINOPHIL # BLD: 0.09 K/UL (ref 0–0.4)
EOSINOPHILS RELATIVE PERCENT: 1 % (ref 1–4)
ERYTHROCYTE [DISTWIDTH] IN BLOOD BY AUTOMATED COUNT: 18.1 % (ref 11.8–14.4)
GFR SERPL CREATININE-BSD FRML MDRD: >60 ML/MIN/1.73M2
GLUCOSE BLD-MCNC: 243 MG/DL (ref 65–105)
GLUCOSE BLD-MCNC: 246 MG/DL (ref 65–105)
GLUCOSE BLD-MCNC: 273 MG/DL (ref 65–105)
GLUCOSE BLD-MCNC: 320 MG/DL (ref 65–105)
GLUCOSE BLD-MCNC: 324 MG/DL (ref 65–105)
GLUCOSE BLD-MCNC: 365 MG/DL (ref 65–105)
GLUCOSE BLD-MCNC: 426 MG/DL (ref 65–105)
GLUCOSE SERPL-MCNC: 236 MG/DL (ref 70–99)
HCT VFR BLD AUTO: 22.7 % (ref 36.3–47.1)
HCT VFR BLD AUTO: 24.1 % (ref 36.3–47.1)
HCT VFR BLD AUTO: 28 % (ref 36.3–47.1)
HGB BLD-MCNC: 6.7 G/DL (ref 11.9–15.1)
HGB BLD-MCNC: 6.9 G/DL (ref 11.9–15.1)
HGB BLD-MCNC: 8.9 G/DL (ref 11.9–15.1)
IMM GRANULOCYTES # BLD AUTO: 0.09 K/UL (ref 0–0.3)
IMM GRANULOCYTES NFR BLD: 1 %
LYMPHOCYTES NFR BLD: 0.63 K/UL (ref 1–4.8)
LYMPHOCYTES RELATIVE PERCENT: 7 % (ref 24–44)
MCH RBC QN AUTO: 27.2 PG (ref 25.2–33.5)
MCHC RBC AUTO-ENTMCNC: 28.6 G/DL (ref 28.4–34.8)
MCV RBC AUTO: 94.9 FL (ref 82.6–102.9)
MONOCYTES NFR BLD: 1.26 K/UL (ref 0.1–0.8)
MONOCYTES NFR BLD: 14 % (ref 1–7)
MORPHOLOGY: ABNORMAL
MORPHOLOGY: ABNORMAL
NEUTROPHILS NFR BLD: 77 % (ref 36–66)
NEUTS SEG NFR BLD: 6.93 K/UL (ref 1.8–7.7)
NRBC BLD-RTO: 0.2 PER 100 WBC
PLATELET # BLD AUTO: 95 K/UL (ref 138–453)
PMV BLD AUTO: 10.9 FL (ref 8.1–13.5)
POTASSIUM SERPL-SCNC: 4.6 MMOL/L (ref 3.7–5.3)
RBC # BLD AUTO: 2.54 M/UL (ref 3.95–5.11)
SODIUM SERPL-SCNC: 126 MMOL/L (ref 135–144)
SODIUM SERPL-SCNC: 128 MMOL/L (ref 135–144)
WBC OTHER # BLD: 9 K/UL (ref 3.5–11.3)

## 2023-09-17 PROCEDURE — 99231 SBSQ HOSP IP/OBS SF/LOW 25: CPT | Performed by: NURSE PRACTITIONER

## 2023-09-17 PROCEDURE — 82270 OCCULT BLOOD FECES: CPT

## 2023-09-17 PROCEDURE — 97110 THERAPEUTIC EXERCISES: CPT

## 2023-09-17 PROCEDURE — 1200000000 HC SEMI PRIVATE

## 2023-09-17 PROCEDURE — 86901 BLOOD TYPING SEROLOGIC RH(D): CPT

## 2023-09-17 PROCEDURE — 6370000000 HC RX 637 (ALT 250 FOR IP)

## 2023-09-17 PROCEDURE — 99231 SBSQ HOSP IP/OBS SF/LOW 25: CPT | Performed by: SURGERY

## 2023-09-17 PROCEDURE — 99232 SBSQ HOSP IP/OBS MODERATE 35: CPT | Performed by: STUDENT IN AN ORGANIZED HEALTH CARE EDUCATION/TRAINING PROGRAM

## 2023-09-17 PROCEDURE — 36430 TRANSFUSION BLD/BLD COMPNT: CPT

## 2023-09-17 PROCEDURE — 85025 COMPLETE CBC W/AUTO DIFF WBC: CPT

## 2023-09-17 PROCEDURE — 6370000000 HC RX 637 (ALT 250 FOR IP): Performed by: STUDENT IN AN ORGANIZED HEALTH CARE EDUCATION/TRAINING PROGRAM

## 2023-09-17 PROCEDURE — 82947 ASSAY GLUCOSE BLOOD QUANT: CPT

## 2023-09-17 PROCEDURE — 86920 COMPATIBILITY TEST SPIN: CPT

## 2023-09-17 PROCEDURE — 86900 BLOOD TYPING SEROLOGIC ABO: CPT

## 2023-09-17 PROCEDURE — 84295 ASSAY OF SERUM SODIUM: CPT

## 2023-09-17 PROCEDURE — 36415 COLL VENOUS BLD VENIPUNCTURE: CPT

## 2023-09-17 PROCEDURE — 85014 HEMATOCRIT: CPT

## 2023-09-17 PROCEDURE — 97116 GAIT TRAINING THERAPY: CPT

## 2023-09-17 PROCEDURE — P9016 RBC LEUKOCYTES REDUCED: HCPCS

## 2023-09-17 PROCEDURE — 80048 BASIC METABOLIC PNL TOTAL CA: CPT

## 2023-09-17 PROCEDURE — 85018 HEMOGLOBIN: CPT

## 2023-09-17 PROCEDURE — 86850 RBC ANTIBODY SCREEN: CPT

## 2023-09-17 RX ORDER — SODIUM CHLORIDE 9 MG/ML
INJECTION, SOLUTION INTRAVENOUS PRN
Status: DISCONTINUED | OUTPATIENT
Start: 2023-09-17 | End: 2023-09-19

## 2023-09-17 RX ORDER — SODIUM CHLORIDE 9 MG/ML
INJECTION, SOLUTION INTRAVENOUS PRN
Status: DISCONTINUED | OUTPATIENT
Start: 2023-09-17 | End: 2023-09-17

## 2023-09-17 RX ADMIN — ACETAMINOPHEN 1000 MG: 500 TABLET ORAL at 05:56

## 2023-09-17 RX ADMIN — SENNOSIDES AND DOCUSATE SODIUM 1 TABLET: 50; 8.6 TABLET ORAL at 21:12

## 2023-09-17 RX ADMIN — INSULIN GLARGINE 30 UNITS: 100 INJECTION, SOLUTION SUBCUTANEOUS at 21:17

## 2023-09-17 RX ADMIN — SENNOSIDES AND DOCUSATE SODIUM 1 TABLET: 50; 8.6 TABLET ORAL at 08:44

## 2023-09-17 RX ADMIN — METHOCARBAMOL 750 MG: 750 TABLET ORAL at 13:43

## 2023-09-17 RX ADMIN — INSULIN LISPRO 4 UNITS: 100 INJECTION, SOLUTION INTRAVENOUS; SUBCUTANEOUS at 21:17

## 2023-09-17 RX ADMIN — GABAPENTIN 300 MG: 300 CAPSULE ORAL at 21:12

## 2023-09-17 RX ADMIN — GABAPENTIN 300 MG: 300 CAPSULE ORAL at 08:44

## 2023-09-17 RX ADMIN — FUROSEMIDE 20 MG: 40 TABLET ORAL at 17:01

## 2023-09-17 RX ADMIN — GABAPENTIN 300 MG: 300 CAPSULE ORAL at 13:43

## 2023-09-17 RX ADMIN — INSULIN LISPRO 12 UNITS: 100 INJECTION, SOLUTION INTRAVENOUS; SUBCUTANEOUS at 11:49

## 2023-09-17 RX ADMIN — ACETAMINOPHEN 1000 MG: 500 TABLET ORAL at 21:12

## 2023-09-17 RX ADMIN — OXYCODONE HYDROCHLORIDE 5 MG: 5 TABLET ORAL at 05:56

## 2023-09-17 RX ADMIN — SPIRONOLACTONE 50 MG: 25 TABLET ORAL at 08:44

## 2023-09-17 RX ADMIN — INSULIN LISPRO 12 UNITS: 100 INJECTION, SOLUTION INTRAVENOUS; SUBCUTANEOUS at 17:03

## 2023-09-17 RX ADMIN — METHOCARBAMOL 750 MG: 750 TABLET ORAL at 08:44

## 2023-09-17 RX ADMIN — INSULIN LISPRO 8 UNITS: 100 INJECTION, SOLUTION INTRAVENOUS; SUBCUTANEOUS at 08:42

## 2023-09-17 RX ADMIN — METHOCARBAMOL 750 MG: 750 TABLET ORAL at 02:09

## 2023-09-17 RX ADMIN — ALOGLIPTIN 12.5 MG: 12.5 TABLET, FILM COATED ORAL at 08:44

## 2023-09-17 RX ADMIN — INSULIN GLARGINE 30 UNITS: 100 INJECTION, SOLUTION SUBCUTANEOUS at 08:41

## 2023-09-17 RX ADMIN — INSULIN LISPRO 16 UNITS: 100 INJECTION, SOLUTION INTRAVENOUS; SUBCUTANEOUS at 17:02

## 2023-09-17 RX ADMIN — FUROSEMIDE 20 MG: 40 TABLET ORAL at 08:43

## 2023-09-17 RX ADMIN — METHOCARBAMOL 750 MG: 750 TABLET ORAL at 20:06

## 2023-09-17 RX ADMIN — ACETAMINOPHEN 1000 MG: 500 TABLET ORAL at 13:43

## 2023-09-17 RX ADMIN — Medication 1000 UNITS: at 08:44

## 2023-09-17 ASSESSMENT — PAIN - FUNCTIONAL ASSESSMENT
PAIN_FUNCTIONAL_ASSESSMENT: PREVENTS OR INTERFERES SOME ACTIVE ACTIVITIES AND ADLS
PAIN_FUNCTIONAL_ASSESSMENT: PREVENTS OR INTERFERES SOME ACTIVE ACTIVITIES AND ADLS

## 2023-09-17 ASSESSMENT — PAIN DESCRIPTION - ORIENTATION
ORIENTATION: LEFT
ORIENTATION: LEFT

## 2023-09-17 ASSESSMENT — PAIN SCALES - GENERAL
PAINLEVEL_OUTOF10: 8
PAINLEVEL_OUTOF10: 0
PAINLEVEL_OUTOF10: 4
PAINLEVEL_OUTOF10: 0

## 2023-09-17 ASSESSMENT — PAIN DESCRIPTION - DESCRIPTORS
DESCRIPTORS: ACHING;DISCOMFORT
DESCRIPTORS: ACHING;DISCOMFORT;THROBBING

## 2023-09-17 ASSESSMENT — PAIN DESCRIPTION - LOCATION
LOCATION: HIP
LOCATION: LEG;HIP

## 2023-09-17 NOTE — PLAN OF CARE
Problem: Discharge Planning  Goal: Discharge to home or other facility with appropriate resources  9/16/2023 2203 by Claudell Blight, RN  Outcome: Progressing  9/16/2023 1500 by Army Danielito RN  Outcome: Progressing     Problem: Pain  Goal: Verbalizes/displays adequate comfort level or baseline comfort level  9/16/2023 2203 by Claudell Blight, RN  Outcome: Progressing  9/16/2023 1500 by Army Danielito RN  Outcome: Progressing     Problem: ABCDS Injury Assessment  Goal: Absence of physical injury  9/16/2023 2203 by Claudell Blight, RN  Outcome: Progressing  9/16/2023 1500 by Army Danielito RN  Outcome: Progressing     Problem: Skin/Tissue Integrity  Goal: Absence of new skin breakdown  Description: 1. Monitor for areas of redness and/or skin breakdown  2. Assess vascular access sites hourly  3. Every 4-6 hours minimum:  Change oxygen saturation probe site  4. Every 4-6 hours:  If on nasal continuous positive airway pressure, respiratory therapy assess nares and determine need for appliance change or resting period.   9/16/2023 2203 by Claudell Blight, RN  Outcome: Progressing  9/16/2023 1500 by Army Danielito RN  Outcome: Progressing     Problem: Safety - Adult  Goal: Free from fall injury  9/16/2023 2203 by Claudell Blight, RN  Outcome: Progressing  9/16/2023 1500 by Army Danielito RN  Outcome: Progressing     Problem: Chronic Conditions and Co-morbidities  Goal: Patient's chronic conditions and co-morbidity symptoms are monitored and maintained or improved  9/16/2023 2203 by Claudell Blight, RN  Outcome: Progressing  9/16/2023 1500 by Army Danielito RN  Outcome: Progressing

## 2023-09-17 NOTE — FLOWSHEET NOTE
Lab reports HGB of 6.9. [facility] sent to [facility]. Orders received.

## 2023-09-17 NOTE — PLAN OF CARE
Problem: Discharge Planning  Goal: Discharge to home or other facility with appropriate resources  9/17/2023 0109 by Leonce Fleischer, RN  Outcome: Progressing  9/16/2023 2203 by Leonce Fleischer, RN  Outcome: Progressing  9/16/2023 1500 by Kamala Felix RN  Outcome: Progressing     Problem: Pain  Goal: Verbalizes/displays adequate comfort level or baseline comfort level  9/17/2023 0109 by Leonce Fleischer, RN  Outcome: Progressing  9/16/2023 2203 by Leonce Fleischer, RN  Outcome: Progressing  9/16/2023 1500 by Kamala Felix RN  Outcome: Progressing     Problem: ABCDS Injury Assessment  Goal: Absence of physical injury  9/17/2023 0109 by Leonce Fleischer, RN  Outcome: Progressing  9/16/2023 2203 by Leonce Fleischer, RN  Outcome: Progressing  9/16/2023 1500 by Kamala Felix RN  Outcome: Progressing     Problem: Skin/Tissue Integrity  Goal: Absence of new skin breakdown  Description: 1. Monitor for areas of redness and/or skin breakdown  2. Assess vascular access sites hourly  3. Every 4-6 hours minimum:  Change oxygen saturation probe site  4. Every 4-6 hours:  If on nasal continuous positive airway pressure, respiratory therapy assess nares and determine need for appliance change or resting period.   9/17/2023 0109 by Leonce Fleischer, RN  Outcome: Progressing  9/16/2023 2203 by Leonce Fleischer, RN  Outcome: Progressing  9/16/2023 1500 by Kamala Felix RN  Outcome: Progressing     Problem: Safety - Adult  Goal: Free from fall injury  9/17/2023 0109 by Leonce Fleischer, RN  Outcome: Progressing  9/16/2023 2203 by Leonce Fleischer, RN  Outcome: Progressing  9/16/2023 1500 by Kamala Felix RN  Outcome: Progressing     Problem: Chronic Conditions and Co-morbidities  Goal: Patient's chronic conditions and co-morbidity symptoms are monitored and maintained or improved  9/16/2023 2203 by Leonce Fleischer, RN  Outcome: Progressing  9/16/2023 1500 by Kamala Felix RN  Outcome: Progressing

## 2023-09-17 NOTE — CARE COORDINATION
Transitional Planning  Spoke with patient, agreeable to SNF vs ARU. ARU choice is [facility]. SNF list provided. PM&R ordered. Referral to [facility]. Post Acute Facility/Agency List     Provided patient with the following list, the list includes the overall star ratings obtained from CMS per the Medicare Web site (www.Medicare.gov):     [] [address]  [x] Acute Inpatient Rehabilitation Facilities  [x] Skilled Nursing Facilities  [] Home Care    Provided verbal instructions on how to utilize the QR Code to obtain additional detailed star ratings from www. Medicare. gov     offered to print and provide the detailed list:    []Accepted   [x]Declined      220 pm spoke with dtr at bedside, who is a PT, SNF choices are [facility] at [facility]. Referrals sent.
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
## 2023-09-18 LAB
ANION GAP SERPL CALCULATED.3IONS-SCNC: 9 MMOL/L (ref 9–17)
BASOPHILS # BLD: 0 K/UL (ref 0–0.2)
BASOPHILS NFR BLD: 0 % (ref 0–2)
BUN SERPL-MCNC: 27 MG/DL (ref 8–23)
CALCIUM SERPL-MCNC: 8.5 MG/DL (ref 8.6–10.4)
CHLORIDE SERPL-SCNC: 94 MMOL/L (ref 98–107)
CO2 SERPL-SCNC: 24 MMOL/L (ref 20–31)
CREAT SERPL-MCNC: 0.6 MG/DL (ref 0.5–0.9)
EOSINOPHIL # BLD: 0.08 K/UL (ref 0–0.44)
EOSINOPHILS RELATIVE PERCENT: 1 % (ref 1–4)
ERYTHROCYTE [DISTWIDTH] IN BLOOD BY AUTOMATED COUNT: 17.8 % (ref 11.8–14.4)
GFR SERPL CREATININE-BSD FRML MDRD: >60 ML/MIN/1.73M2
GLUCOSE BLD-MCNC: 200 MG/DL (ref 65–105)
GLUCOSE BLD-MCNC: 279 MG/DL (ref 65–105)
GLUCOSE BLD-MCNC: 289 MG/DL (ref 65–105)
GLUCOSE BLD-MCNC: 290 MG/DL (ref 65–105)
GLUCOSE BLD-MCNC: 295 MG/DL (ref 65–105)
GLUCOSE BLD-MCNC: 343 MG/DL (ref 65–105)
GLUCOSE SERPL-MCNC: 293 MG/DL (ref 70–99)
HCT VFR BLD AUTO: 26.2 % (ref 36.3–47.1)
HGB BLD-MCNC: 8 G/DL (ref 11.9–15.1)
IMM GRANULOCYTES # BLD AUTO: 0.08 K/UL (ref 0–0.3)
IMM GRANULOCYTES NFR BLD: 1 %
LYMPHOCYTES NFR BLD: 0.49 K/UL (ref 1.1–3.7)
LYMPHOCYTES RELATIVE PERCENT: 6 % (ref 24–43)
MCH RBC QN AUTO: 27.1 PG (ref 25.2–33.5)
MCHC RBC AUTO-ENTMCNC: 30.5 G/DL (ref 28.4–34.8)
MCV RBC AUTO: 88.8 FL (ref 82.6–102.9)
MONOCYTES NFR BLD: 0.9 K/UL (ref 0.1–1.2)
MONOCYTES NFR BLD: 11 % (ref 3–12)
MORPHOLOGY: ABNORMAL
NEUTROPHILS NFR BLD: 81 % (ref 36–65)
NEUTS SEG NFR BLD: 6.65 K/UL (ref 1.5–8.1)
NRBC BLD-RTO: 0.5 PER 100 WBC
PLATELET # BLD AUTO: 103 K/UL (ref 138–453)
PMV BLD AUTO: 10.9 FL (ref 8.1–13.5)
POTASSIUM SERPL-SCNC: 4.6 MMOL/L (ref 3.7–5.3)
RBC # BLD AUTO: 2.95 M/UL (ref 3.95–5.11)
SODIUM SERPL-SCNC: 127 MMOL/L (ref 135–144)
WBC OTHER # BLD: 8.2 K/UL (ref 3.5–11.3)

## 2023-09-18 PROCEDURE — 6370000000 HC RX 637 (ALT 250 FOR IP)

## 2023-09-18 PROCEDURE — 1200000000 HC SEMI PRIVATE

## 2023-09-18 PROCEDURE — 97110 THERAPEUTIC EXERCISES: CPT

## 2023-09-18 PROCEDURE — 97530 THERAPEUTIC ACTIVITIES: CPT

## 2023-09-18 PROCEDURE — 85025 COMPLETE CBC W/AUTO DIFF WBC: CPT

## 2023-09-18 PROCEDURE — 99222 1ST HOSP IP/OBS MODERATE 55: CPT | Performed by: PHYSICAL MEDICINE & REHABILITATION

## 2023-09-18 PROCEDURE — 36415 COLL VENOUS BLD VENIPUNCTURE: CPT

## 2023-09-18 PROCEDURE — 82947 ASSAY GLUCOSE BLOOD QUANT: CPT

## 2023-09-18 PROCEDURE — 80048 BASIC METABOLIC PNL TOTAL CA: CPT

## 2023-09-18 PROCEDURE — G0108 DIAB MANAGE TRN  PER INDIV: HCPCS

## 2023-09-18 PROCEDURE — 99232 SBSQ HOSP IP/OBS MODERATE 35: CPT | Performed by: STUDENT IN AN ORGANIZED HEALTH CARE EDUCATION/TRAINING PROGRAM

## 2023-09-18 PROCEDURE — 6370000000 HC RX 637 (ALT 250 FOR IP): Performed by: STUDENT IN AN ORGANIZED HEALTH CARE EDUCATION/TRAINING PROGRAM

## 2023-09-18 PROCEDURE — 6360000002 HC RX W HCPCS: Performed by: STUDENT IN AN ORGANIZED HEALTH CARE EDUCATION/TRAINING PROGRAM

## 2023-09-18 RX ORDER — INSULIN LISPRO 100 [IU]/ML
0-4 INJECTION, SOLUTION INTRAVENOUS; SUBCUTANEOUS NIGHTLY
Status: DISCONTINUED | OUTPATIENT
Start: 2023-09-18 | End: 2023-09-19

## 2023-09-18 RX ORDER — LACTULOSE 10 G/15ML
20 SOLUTION ORAL 3 TIMES DAILY PRN
Status: DISCONTINUED | OUTPATIENT
Start: 2023-09-18 | End: 2023-09-20

## 2023-09-18 RX ORDER — GABAPENTIN 300 MG/1
300 CAPSULE ORAL EVERY 8 HOURS
Status: DISCONTINUED | OUTPATIENT
Start: 2023-09-18 | End: 2023-09-21 | Stop reason: HOSPADM

## 2023-09-18 RX ORDER — INSULIN GLARGINE 100 [IU]/ML
32 INJECTION, SOLUTION SUBCUTANEOUS 2 TIMES DAILY
Status: DISCONTINUED | OUTPATIENT
Start: 2023-09-18 | End: 2023-09-19

## 2023-09-18 RX ORDER — INSULIN LISPRO 100 [IU]/ML
0-8 INJECTION, SOLUTION INTRAVENOUS; SUBCUTANEOUS
Status: DISCONTINUED | OUTPATIENT
Start: 2023-09-18 | End: 2023-09-19

## 2023-09-18 RX ORDER — INSULIN LISPRO 100 [IU]/ML
0-16 INJECTION, SOLUTION INTRAVENOUS; SUBCUTANEOUS EVERY 4 HOURS
Status: DISCONTINUED | OUTPATIENT
Start: 2023-09-18 | End: 2023-09-18

## 2023-09-18 RX ORDER — OXYCODONE HYDROCHLORIDE 5 MG/1
5 TABLET ORAL EVERY 8 HOURS PRN
Status: DISCONTINUED | OUTPATIENT
Start: 2023-09-18 | End: 2023-09-21 | Stop reason: HOSPADM

## 2023-09-18 RX ORDER — ENOXAPARIN SODIUM 100 MG/ML
30 INJECTION SUBCUTANEOUS 2 TIMES DAILY
Status: DISCONTINUED | OUTPATIENT
Start: 2023-09-18 | End: 2023-09-21 | Stop reason: HOSPADM

## 2023-09-18 RX ORDER — METHOCARBAMOL 750 MG/1
750 TABLET, FILM COATED ORAL EVERY 8 HOURS
Status: DISCONTINUED | OUTPATIENT
Start: 2023-09-18 | End: 2023-09-19

## 2023-09-18 RX ORDER — LIDOCAINE 4 G/G
1 PATCH TOPICAL DAILY
Status: DISCONTINUED | OUTPATIENT
Start: 2023-09-18 | End: 2023-09-21 | Stop reason: HOSPADM

## 2023-09-18 RX ADMIN — SENNOSIDES AND DOCUSATE SODIUM 1 TABLET: 50; 8.6 TABLET ORAL at 09:24

## 2023-09-18 RX ADMIN — ACETAMINOPHEN 1000 MG: 500 TABLET ORAL at 14:12

## 2023-09-18 RX ADMIN — ALOGLIPTIN 12.5 MG: 12.5 TABLET, FILM COATED ORAL at 09:25

## 2023-09-18 RX ADMIN — INSULIN LISPRO 12 UNITS: 100 INJECTION, SOLUTION INTRAVENOUS; SUBCUTANEOUS at 12:02

## 2023-09-18 RX ADMIN — FUROSEMIDE 20 MG: 40 TABLET ORAL at 16:41

## 2023-09-18 RX ADMIN — INSULIN LISPRO 4 UNITS: 100 INJECTION, SOLUTION INTRAVENOUS; SUBCUTANEOUS at 16:44

## 2023-09-18 RX ADMIN — INSULIN LISPRO 4 UNITS: 100 INJECTION, SOLUTION INTRAVENOUS; SUBCUTANEOUS at 21:02

## 2023-09-18 RX ADMIN — INSULIN LISPRO 10 UNITS: 100 INJECTION, SOLUTION INTRAVENOUS; SUBCUTANEOUS at 09:30

## 2023-09-18 RX ADMIN — ENOXAPARIN SODIUM 30 MG: 100 INJECTION SUBCUTANEOUS at 21:02

## 2023-09-18 RX ADMIN — ENOXAPARIN SODIUM 30 MG: 100 INJECTION SUBCUTANEOUS at 09:23

## 2023-09-18 RX ADMIN — INSULIN LISPRO 12 UNITS: 100 INJECTION, SOLUTION INTRAVENOUS; SUBCUTANEOUS at 16:47

## 2023-09-18 RX ADMIN — FUROSEMIDE 20 MG: 40 TABLET ORAL at 09:23

## 2023-09-18 RX ADMIN — ACETAMINOPHEN 1000 MG: 500 TABLET ORAL at 21:01

## 2023-09-18 RX ADMIN — INSULIN GLARGINE 32 UNITS: 100 INJECTION, SOLUTION SUBCUTANEOUS at 21:02

## 2023-09-18 RX ADMIN — INSULIN LISPRO 4 UNITS: 100 INJECTION, SOLUTION INTRAVENOUS; SUBCUTANEOUS at 12:03

## 2023-09-18 RX ADMIN — LACTULOSE 20 G: 20 SOLUTION ORAL at 16:52

## 2023-09-18 RX ADMIN — INSULIN LISPRO 12 UNITS: 100 INJECTION, SOLUTION INTRAVENOUS; SUBCUTANEOUS at 09:28

## 2023-09-18 RX ADMIN — GABAPENTIN 300 MG: 300 CAPSULE ORAL at 16:42

## 2023-09-18 RX ADMIN — METHOCARBAMOL 750 MG: 750 TABLET ORAL at 16:42

## 2023-09-18 RX ADMIN — GABAPENTIN 300 MG: 300 CAPSULE ORAL at 09:24

## 2023-09-18 RX ADMIN — INSULIN LISPRO 18 UNITS: 100 INJECTION, SOLUTION INTRAVENOUS; SUBCUTANEOUS at 04:30

## 2023-09-18 RX ADMIN — SENNOSIDES AND DOCUSATE SODIUM 1 TABLET: 50; 8.6 TABLET ORAL at 21:01

## 2023-09-18 RX ADMIN — METHOCARBAMOL 750 MG: 750 TABLET ORAL at 02:04

## 2023-09-18 RX ADMIN — INSULIN GLARGINE 32 UNITS: 100 INJECTION, SOLUTION SUBCUTANEOUS at 09:25

## 2023-09-18 RX ADMIN — Medication 1000 UNITS: at 09:25

## 2023-09-18 RX ADMIN — SPIRONOLACTONE 50 MG: 25 TABLET ORAL at 09:24

## 2023-09-18 RX ADMIN — ACETAMINOPHEN 1000 MG: 500 TABLET ORAL at 05:20

## 2023-09-18 RX ADMIN — POLYETHYLENE GLYCOL 3350 17 G: 17 POWDER, FOR SOLUTION ORAL at 08:49

## 2023-09-18 ASSESSMENT — PAIN DESCRIPTION - DESCRIPTORS: DESCRIPTORS: DULL;DISCOMFORT

## 2023-09-18 ASSESSMENT — PAIN SCALES - GENERAL: PAINLEVEL_OUTOF10: 5

## 2023-09-18 ASSESSMENT — PAIN DESCRIPTION - LOCATION: LOCATION: GENERALIZED;RIB CAGE

## 2023-09-18 ASSESSMENT — PAIN DESCRIPTION - ORIENTATION: ORIENTATION: LEFT

## 2023-09-18 NOTE — PROGRESS NOTES
Inpatient Diabetes Education    Bibiana Gastelum was seen for evaluation and education on Type 2 uncontrolled    Lab Results   Component Value Date    LABA1C 12.2 (H) 09/13/2023     Brandon Duncan states that she has had diabetes for about 20 years. She reports that she has had education in the past. Her knowledge is evident in her responses to me. She reports that her first  had diabetes and she gave him insulin using a syringe/vial. She reports having seen him have low blood sugar emergencies on several occassions and she states that is why she has been hesitant to take insulin herself. She states that she has a PCP who she sees regularly. 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 Brandon Duncan reports that she has a glucometer at home but has grown weary of checking her BG by that method. She states that she had a CGM sensor in place but that it likely got removed at some point during her hospitalization because she can no longer find it on her arm. Her PCP office placed the sensor and she has a reader. (From what she describes, it is a Babak 2). She said that she would like to eventually learn how to change the sensors herself. We spent some time discussing CGM and I showed her the [place] [employer] Video about sensor application. She confirmed her home diabetes medications - three oral meds of metformin, glimepiride and sitagliptin. Following Survival Skills education topics were covered as appropriate to patient plan of for care:  __X_ Type 2 Diabetes diagnosis as chronic and progressive  __X_ Healthy eating - CHO food groups and need for CHO controlled diet.   Elimination of sugary beverages, avoid skipping meal  _X__ Blood Glucose Self-Monitoring / CGM   __X_ Blood sugar targets Pre meal 80 - 130 and 2 hours post meal < 180  __X_ Hyperglycemia  ( BG over 250) signs and symptoms and treatment   _X__ Hypoglycemia( BG < than 70) signs and symptoms and treatment \"Rule of 15\"  __X_ Keep BG log and take

## 2023-09-18 NOTE — PROGRESS NOTES
hand placement with poor carryover. Pt utilized RW to come to stand, however displayed correct hand placement when coming to a sit. Pt requierd cues to achieve full upright posture with moderate carryover. Ambulation  WB Status: LLE WBAT  Ambulation  Surface: Level tile  Device: Rolling Walker  Assistance: Minimal assistance  Quality of Gait: Heavy reliance on UEs, Pt demonstrated L LE knee instability however no true buckle, fair stability, chair follow. Gait Deviations: Decreased step length;Decreased step height;Slow Vaishali  Distance: 30ft + 5ft chair to bed. Comments: Pt required cueing for maintained LLE terminal knee ext. at stance phase with good carryover, verbal cues for breathing, pt reported light headedness never progressed resulting in end of ambulation session. More Ambulation?: No  Stairs/Curb  Stairs?: No     Balance  Posture: Good  Sitting - Static: Fair;+  Sitting - Dynamic: Fair;+  Standing - Static: Fair  Standing - Dynamic: Fair;-  Comments: Assessed sitting EOB, Standing assessed with RW. Exercise Treatment: Supine LLE AAROM: Hip abduction, KTC, heel slides x 10 reps. Seated LE exercise program: Long Arc Quads, heel/toe raises x 10 reps LLE AAROM. Seated B LE: Marching x 5 reps ea. LLE AAROM. OutComes Score    AM-PAC Score  AM-PAC Inpatient Mobility Raw Score : 16 (09/18/23 1153)  -PAC Inpatient T-Scale Score : 40.78 (09/18/23 1153)  Mobility Inpatient CMS 0-100% Score: 54.16 (09/18/23 1153)  Mobility Inpatient CMS G-Code Modifier : CK (09/18/23 1153)    Goals  Short Term Goals  Time Frame for Short Term Goals: 14 visits  Short Term Goal 1: Supine to/from sit with SBA. Short Term Goal 2: Sit to/from stand with SBA. Short Term Goal 3: Ambulate 76' RW CGA, WBAT LLE. Short Term Goal 4: Improve L hip and knee strength to at least 3/5. Education  Patient Education  Education Given To: Patient  Education Provided: Role of Therapy;Plan of Care;Precautions; Home Exercise Program;Transfer Training  Education Method: Demonstration;Verbal  Barriers to Learning: None  Education Outcome: Verbalized understanding;Demonstrated understanding      Therapy Time   Individual Concurrent Group Co-treatment   Time In 1110         Time Out 1140         Minutes 30         Timed Code Treatment Minutes: [address] [place], [place]
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
## 2023-09-18 NOTE — PLAN OF CARE
Problem: Discharge Planning  Goal: Discharge to home or other facility with appropriate resources  9/18/2023 1721 by Gibran Henry RN  Outcome: Progressing  9/18/2023 0448 by Shanti Mendoza RN  Outcome: Progressing  Flowsheets (Taken 9/17/2023 2000)  Discharge to home or other facility with appropriate resources:   Identify barriers to discharge with patient and caregiver   Arrange for needed discharge resources and transportation as appropriate   Identify discharge learning needs (meds, wound care, etc)     Problem: Pain  Goal: Verbalizes/displays adequate comfort level or baseline comfort level  9/18/2023 1721 by Gibran Henry RN  Outcome: Progressing  9/18/2023 0448 by Shanti Mendoza RN  Outcome: Progressing     Problem: ABCDS Injury Assessment  Goal: Absence of physical injury  9/18/2023 1721 by Gibran Henry RN  Outcome: Progressing  9/18/2023 0448 by Shanti Mendoza RN  Outcome: Progressing     Problem: Skin/Tissue Integrity  Goal: Absence of new skin breakdown  Description: 1. Monitor for areas of redness and/or skin breakdown  2. Assess vascular access sites hourly  3. Every 4-6 hours minimum:  Change oxygen saturation probe site  4. Every 4-6 hours:  If on nasal continuous positive airway pressure, respiratory therapy assess nares and determine need for appliance change or resting period.   9/18/2023 1721 by Gibran Henry RN  Outcome: Progressing  9/18/2023 0448 by Shanti Mendoza RN  Outcome: Progressing     Problem: Safety - Adult  Goal: Free from fall injury  9/18/2023 1721 by Gibran Henry RN  Outcome: Progressing  9/18/2023 0448 by Shanti Mendoza RN  Outcome: Progressing     Problem: Chronic Conditions and Co-morbidities  Goal: Patient's chronic conditions and co-morbidity symptoms are monitored and maintained or improved  9/18/2023 1721 by Gibran Henry RN  Outcome: Progressing  9/18/2023 0448 by Shanti Mendoza RN  Outcome: Progressing  Flowsheets (Taken

## 2023-09-18 NOTE — PLAN OF CARE
Problem: Discharge Planning  Goal: Discharge to home or other facility with appropriate resources  9/18/2023 0448 by Dominique Wyatt RN  Outcome: Progressing  Flowsheets (Taken 9/17/2023 2000)  Discharge to home or other facility with appropriate resources:   Identify barriers to discharge with patient and caregiver   Arrange for needed discharge resources and transportation as appropriate   Identify discharge learning needs (meds, wound care, etc)  9/17/2023 1746 by Wiliam Mcdonald RN  Outcome: Progressing     Problem: Pain  Goal: Verbalizes/displays adequate comfort level or baseline comfort level  9/18/2023 0448 by Dominique Wyatt RN  Outcome: Progressing  9/17/2023 1746 by Wiliam Mcdonald RN  Outcome: Progressing     Problem: ABCDS Injury Assessment  Goal: Absence of physical injury  9/18/2023 0448 by Dominique Wyatt RN  Outcome: Progressing  9/17/2023 1746 by Wiliam Mcdonald RN  Outcome: Progressing     Problem: Skin/Tissue Integrity  Goal: Absence of new skin breakdown  Description: 1. Monitor for areas of redness and/or skin breakdown  2. Assess vascular access sites hourly  3. Every 4-6 hours minimum:  Change oxygen saturation probe site  4. Every 4-6 hours:  If on nasal continuous positive airway pressure, respiratory therapy assess nares and determine need for appliance change or resting period.   9/18/2023 0448 by Dominique Wyatt RN  Outcome: Progressing  9/17/2023 1746 by Wiliam Mcdonald RN  Outcome: Progressing     Problem: Safety - Adult  Goal: Free from fall injury  9/18/2023 0448 by Dominique Wyatt RN  Outcome: Progressing  9/17/2023 1746 by Wiliam Mcdonald RN  Outcome: Progressing     Problem: Chronic Conditions and Co-morbidities  Goal: Patient's chronic conditions and co-morbidity symptoms are monitored and maintained or improved  9/18/2023 0448 by Dominiuqe Wyatt RN  Outcome: Progressing  Flowsheets (Taken 9/17/2023 2000)  Care Plan - Patient's Chronic Conditions and

## 2023-09-18 NOTE — CARE COORDINATION
Spoke with Mary Miller at [address] they have accepted and she is starting precert

## 2023-09-19 ENCOUNTER — APPOINTMENT (OUTPATIENT)
Dept: GENERAL RADIOLOGY | Age: 78
End: 2023-09-19
Payer: MEDICARE

## 2023-09-19 LAB
ABO/RH: NORMAL
ALBUMIN SERPL-MCNC: 2.8 G/DL (ref 3.5–5.2)
ALBUMIN/GLOB SERPL: 0.9 {RATIO} (ref 1–2.5)
ALP SERPL-CCNC: 176 U/L (ref 35–104)
ALT SERPL-CCNC: 22 U/L (ref 5–33)
ANION GAP SERPL CALCULATED.3IONS-SCNC: 13 MMOL/L (ref 9–17)
ANTIBODY SCREEN: NEGATIVE
ARM BAND NUMBER: NORMAL
AST SERPL-CCNC: 49 U/L
BASOPHILS # BLD: 0 K/UL (ref 0–0.2)
BASOPHILS NFR BLD: 0 % (ref 0–2)
BILIRUB DIRECT SERPL-MCNC: 0.5 MG/DL
BILIRUB INDIRECT SERPL-MCNC: 0.7 MG/DL (ref 0–1)
BILIRUB SERPL-MCNC: 1.2 MG/DL (ref 0.3–1.2)
BILIRUB UR QL STRIP: NEGATIVE
BLOOD BANK BLOOD PRODUCT EXPIRATION DATE: NORMAL
BLOOD BANK DISPENSE STATUS: NORMAL
BLOOD BANK DISPENSE STATUS: NORMAL
BLOOD BANK ISBT PRODUCT BLOOD TYPE: 5100
BLOOD BANK PRODUCT CODE: NORMAL
BLOOD BANK SAMPLE EXPIRATION: NORMAL
BLOOD BANK UNIT TYPE AND RH: NORMAL
BPU ID: NORMAL
BPU ID: NORMAL
BUN SERPL-MCNC: 26 MG/DL (ref 8–23)
CALCIUM SERPL-MCNC: 8.3 MG/DL (ref 8.6–10.4)
CHLORIDE SERPL-SCNC: 90 MMOL/L (ref 98–107)
CLARITY UR: CLEAR
CO2 SERPL-SCNC: 22 MMOL/L (ref 20–31)
COLOR UR: YELLOW
COMPONENT: NORMAL
COMPONENT: NORMAL
CREAT SERPL-MCNC: 0.6 MG/DL (ref 0.5–0.9)
CROSSMATCH RESULT: NORMAL
CROSSMATCH RESULT: NORMAL
DATE, STOOL #1: NORMAL
EOSINOPHIL # BLD: 0 K/UL (ref 0–0.4)
EOSINOPHILS RELATIVE PERCENT: 0 % (ref 1–4)
EPI CELLS #/AREA URNS HPF: ABNORMAL /HPF (ref 0–5)
ERYTHROCYTE [DISTWIDTH] IN BLOOD BY AUTOMATED COUNT: 18.4 % (ref 11.8–14.4)
GFR SERPL CREATININE-BSD FRML MDRD: >60 ML/MIN/1.73M2
GLUCOSE BLD-MCNC: 214 MG/DL (ref 65–105)
GLUCOSE BLD-MCNC: 271 MG/DL (ref 65–105)
GLUCOSE BLD-MCNC: 271 MG/DL (ref 65–105)
GLUCOSE BLD-MCNC: 298 MG/DL (ref 65–105)
GLUCOSE SERPL-MCNC: 304 MG/DL (ref 70–99)
GLUCOSE UR STRIP-MCNC: ABNORMAL MG/DL
HCT VFR BLD AUTO: 28.5 % (ref 36.3–47.1)
HEMOCCULT SP1 STL QL: NEGATIVE
HGB BLD-MCNC: 9.4 G/DL (ref 11.9–15.1)
HGB UR QL STRIP.AUTO: NEGATIVE
IMM GRANULOCYTES # BLD AUTO: 0.14 K/UL (ref 0–0.3)
IMM GRANULOCYTES NFR BLD: 1 %
KETONES UR STRIP-MCNC: NEGATIVE MG/DL
LEUKOCYTE ESTERASE UR QL STRIP: NEGATIVE
LIPASE SERPL-CCNC: 13 U/L (ref 13–60)
LYMPHOCYTES NFR BLD: 0.14 K/UL (ref 1–4.8)
LYMPHOCYTES RELATIVE PERCENT: 1 % (ref 24–44)
MCH RBC QN AUTO: 27.4 PG (ref 25.2–33.5)
MCHC RBC AUTO-ENTMCNC: 33 G/DL (ref 28.4–34.8)
MCV RBC AUTO: 83.1 FL (ref 82.6–102.9)
MONOCYTES NFR BLD: 1.15 K/UL (ref 0.1–0.8)
MONOCYTES NFR BLD: 8 % (ref 1–7)
MORPHOLOGY: ABNORMAL
NEUTROPHILS NFR BLD: 90 % (ref 36–66)
NEUTS SEG NFR BLD: 12.97 K/UL (ref 1.8–7.7)
NITRITE UR QL STRIP: NEGATIVE
NRBC BLD-RTO: 0.3 PER 100 WBC
PH UR STRIP: 5.5 [PH] (ref 5–8)
PLATELET # BLD AUTO: 126 K/UL (ref 138–453)
PMV BLD AUTO: 11.1 FL (ref 8.1–13.5)
POTASSIUM SERPL-SCNC: 4.1 MMOL/L (ref 3.7–5.3)
PROCALCITONIN SERPL-MCNC: 0.82 NG/ML
PROT SERPL-MCNC: 5.8 G/DL (ref 6.4–8.3)
PROT UR STRIP-MCNC: ABNORMAL MG/DL
RBC # BLD AUTO: 3.43 M/UL (ref 3.95–5.11)
RBC #/AREA URNS HPF: ABNORMAL /HPF (ref 0–4)
SODIUM SERPL-SCNC: 125 MMOL/L (ref 135–144)
SP GR UR STRIP: 1.02 (ref 1–1.03)
TIME, STOOL #1: NORMAL
TRANSFUSION STATUS: NORMAL
TRANSFUSION STATUS: NORMAL
UNIT DIVISION: 0
UNIT DIVISION: 0
UNIT ISSUE DATE/TIME: NORMAL
UROBILINOGEN UR STRIP-ACNC: NORMAL EU/DL (ref 0–1)
WBC #/AREA URNS HPF: ABNORMAL /HPF (ref 0–5)
WBC OTHER # BLD: 14.4 K/UL (ref 3.5–11.3)

## 2023-09-19 PROCEDURE — 80048 BASIC METABOLIC PNL TOTAL CA: CPT

## 2023-09-19 PROCEDURE — 1200000000 HC SEMI PRIVATE

## 2023-09-19 PROCEDURE — 6370000000 HC RX 637 (ALT 250 FOR IP): Performed by: STUDENT IN AN ORGANIZED HEALTH CARE EDUCATION/TRAINING PROGRAM

## 2023-09-19 PROCEDURE — 6360000002 HC RX W HCPCS: Performed by: STUDENT IN AN ORGANIZED HEALTH CARE EDUCATION/TRAINING PROGRAM

## 2023-09-19 PROCEDURE — 85025 COMPLETE CBC W/AUTO DIFF WBC: CPT

## 2023-09-19 PROCEDURE — 6360000002 HC RX W HCPCS: Performed by: NURSE PRACTITIONER

## 2023-09-19 PROCEDURE — 36415 COLL VENOUS BLD VENIPUNCTURE: CPT

## 2023-09-19 PROCEDURE — 83690 ASSAY OF LIPASE: CPT

## 2023-09-19 PROCEDURE — 84145 PROCALCITONIN (PCT): CPT

## 2023-09-19 PROCEDURE — 81001 URINALYSIS AUTO W/SCOPE: CPT

## 2023-09-19 PROCEDURE — 6370000000 HC RX 637 (ALT 250 FOR IP): Performed by: NURSE PRACTITIONER

## 2023-09-19 PROCEDURE — 82947 ASSAY GLUCOSE BLOOD QUANT: CPT

## 2023-09-19 PROCEDURE — 80076 HEPATIC FUNCTION PANEL: CPT

## 2023-09-19 PROCEDURE — 6370000000 HC RX 637 (ALT 250 FOR IP)

## 2023-09-19 PROCEDURE — 71045 X-RAY EXAM CHEST 1 VIEW: CPT

## 2023-09-19 PROCEDURE — G0108 DIAB MANAGE TRN  PER INDIV: HCPCS

## 2023-09-19 PROCEDURE — 99232 SBSQ HOSP IP/OBS MODERATE 35: CPT | Performed by: INTERNAL MEDICINE

## 2023-09-19 RX ORDER — METHOCARBAMOL 750 MG/1
750 TABLET, FILM COATED ORAL EVERY 8 HOURS PRN
Status: DISCONTINUED | OUTPATIENT
Start: 2023-09-19 | End: 2023-09-21 | Stop reason: HOSPADM

## 2023-09-19 RX ORDER — INSULIN LISPRO 100 [IU]/ML
0-16 INJECTION, SOLUTION INTRAVENOUS; SUBCUTANEOUS
Status: DISCONTINUED | OUTPATIENT
Start: 2023-09-19 | End: 2023-09-21 | Stop reason: HOSPADM

## 2023-09-19 RX ORDER — BISACODYL 10 MG
10 SUPPOSITORY, RECTAL RECTAL ONCE
Status: COMPLETED | OUTPATIENT
Start: 2023-09-19 | End: 2023-09-19

## 2023-09-19 RX ORDER — SCOLOPAMINE TRANSDERMAL SYSTEM 1 MG/1
1 PATCH, EXTENDED RELEASE TRANSDERMAL
Status: DISCONTINUED | OUTPATIENT
Start: 2023-09-19 | End: 2023-09-19

## 2023-09-19 RX ORDER — ONDANSETRON 2 MG/ML
4 INJECTION INTRAMUSCULAR; INTRAVENOUS ONCE
Status: COMPLETED | OUTPATIENT
Start: 2023-09-19 | End: 2023-09-19

## 2023-09-19 RX ORDER — INSULIN GLARGINE 100 [IU]/ML
50 INJECTION, SOLUTION SUBCUTANEOUS 2 TIMES DAILY
Status: DISCONTINUED | OUTPATIENT
Start: 2023-09-19 | End: 2023-09-20

## 2023-09-19 RX ADMIN — METHOCARBAMOL 750 MG: 750 TABLET ORAL at 00:02

## 2023-09-19 RX ADMIN — ONDANSETRON 4 MG: 2 INJECTION INTRAMUSCULAR; INTRAVENOUS at 11:26

## 2023-09-19 RX ADMIN — FUROSEMIDE 20 MG: 40 TABLET ORAL at 10:05

## 2023-09-19 RX ADMIN — ACETAMINOPHEN 1000 MG: 500 TABLET ORAL at 21:47

## 2023-09-19 RX ADMIN — BISACODYL 10 MG: 10 SUPPOSITORY RECTAL at 09:03

## 2023-09-19 RX ADMIN — INSULIN LISPRO 12 UNITS: 100 INJECTION, SOLUTION INTRAVENOUS; SUBCUTANEOUS at 09:00

## 2023-09-19 RX ADMIN — GABAPENTIN 300 MG: 300 CAPSULE ORAL at 10:04

## 2023-09-19 RX ADMIN — GABAPENTIN 300 MG: 300 CAPSULE ORAL at 17:02

## 2023-09-19 RX ADMIN — INSULIN LISPRO 4 UNITS: 100 INJECTION, SOLUTION INTRAVENOUS; SUBCUTANEOUS at 09:01

## 2023-09-19 RX ADMIN — INSULIN LISPRO 4 UNITS: 100 INJECTION, SOLUTION INTRAVENOUS; SUBCUTANEOUS at 12:18

## 2023-09-19 RX ADMIN — FUROSEMIDE 20 MG: 40 TABLET ORAL at 17:57

## 2023-09-19 RX ADMIN — SENNOSIDES AND DOCUSATE SODIUM 1 TABLET: 50; 8.6 TABLET ORAL at 09:04

## 2023-09-19 RX ADMIN — ALOGLIPTIN 12.5 MG: 12.5 TABLET, FILM COATED ORAL at 10:04

## 2023-09-19 RX ADMIN — INSULIN GLARGINE 50 UNITS: 100 INJECTION, SOLUTION SUBCUTANEOUS at 20:35

## 2023-09-19 RX ADMIN — SPIRONOLACTONE 50 MG: 25 TABLET ORAL at 10:04

## 2023-09-19 RX ADMIN — ENOXAPARIN SODIUM 30 MG: 100 INJECTION SUBCUTANEOUS at 08:14

## 2023-09-19 RX ADMIN — INSULIN LISPRO 8 UNITS: 100 INJECTION, SOLUTION INTRAVENOUS; SUBCUTANEOUS at 20:51

## 2023-09-19 RX ADMIN — ACETAMINOPHEN 1000 MG: 500 TABLET ORAL at 14:54

## 2023-09-19 RX ADMIN — INSULIN LISPRO 4 UNITS: 100 INJECTION, SOLUTION INTRAVENOUS; SUBCUTANEOUS at 17:00

## 2023-09-19 RX ADMIN — ENOXAPARIN SODIUM 30 MG: 100 INJECTION SUBCUTANEOUS at 21:46

## 2023-09-19 RX ADMIN — INSULIN GLARGINE 32 UNITS: 100 INJECTION, SOLUTION SUBCUTANEOUS at 09:02

## 2023-09-19 RX ADMIN — LACTULOSE 20 G: 20 SOLUTION ORAL at 00:02

## 2023-09-19 RX ADMIN — POLYETHYLENE GLYCOL 3350 17 G: 17 POWDER, FOR SOLUTION ORAL at 10:04

## 2023-09-19 RX ADMIN — SENNOSIDES AND DOCUSATE SODIUM 1 TABLET: 50; 8.6 TABLET ORAL at 20:35

## 2023-09-19 RX ADMIN — ACETAMINOPHEN 1000 MG: 500 TABLET ORAL at 06:56

## 2023-09-19 RX ADMIN — GABAPENTIN 300 MG: 300 CAPSULE ORAL at 00:02

## 2023-09-19 RX ADMIN — METHOCARBAMOL 750 MG: 750 TABLET ORAL at 10:04

## 2023-09-19 RX ADMIN — Medication 1000 UNITS: at 12:15

## 2023-09-19 ASSESSMENT — PAIN DESCRIPTION - DESCRIPTORS
DESCRIPTORS: ACHING
DESCRIPTORS: SORE

## 2023-09-19 ASSESSMENT — PAIN DESCRIPTION - ORIENTATION
ORIENTATION: LEFT
ORIENTATION: LEFT

## 2023-09-19 ASSESSMENT — PAIN SCALES - GENERAL
PAINLEVEL_OUTOF10: 5
PAINLEVEL_OUTOF10: 0
PAINLEVEL_OUTOF10: 3

## 2023-09-19 ASSESSMENT — PAIN DESCRIPTION - LOCATION
LOCATION: KNEE
LOCATION: KNEE

## 2023-09-19 NOTE — PLAN OF CARE
Problem: Discharge Planning  Goal: Discharge to home or other facility with appropriate resources  9/19/2023 1815 by Regino Frost RN  Outcome: Progressing  9/19/2023 0435 by Kai Tyler RN  Outcome: Progressing     Problem: Pain  Goal: Verbalizes/displays adequate comfort level or baseline comfort level  9/19/2023 1815 by Regino Frost RN  Outcome: Progressing  9/19/2023 0435 by Kai Tyler RN  Outcome: Progressing     Problem: ABCDS Injury Assessment  Goal: Absence of physical injury  9/19/2023 1815 by Regino Frost RN  Outcome: Progressing  9/19/2023 0435 by Kai Tyler RN  Outcome: Progressing     Problem: Skin/Tissue Integrity  Goal: Absence of new skin breakdown  Description: 1. Monitor for areas of redness and/or skin breakdown  2. Assess vascular access sites hourly  3. Every 4-6 hours minimum:  Change oxygen saturation probe site  4. Every 4-6 hours:  If on nasal continuous positive airway pressure, respiratory therapy assess nares and determine need for appliance change or resting period.   9/19/2023 1815 by Regino Frost RN  Outcome: Progressing  9/19/2023 0435 by Kai Tyler RN  Outcome: Progressing     Problem: Safety - Adult  Goal: Free from fall injury  9/19/2023 1815 by Regino Frost RN  Outcome: Progressing  9/19/2023 0435 by Kai Tyler RN  Outcome: Progressing     Problem: Chronic Conditions and Co-morbidities  Goal: Patient's chronic conditions and co-morbidity symptoms are monitored and maintained or improved  9/19/2023 1815 by Regino Frost RN  Outcome: Progressing  9/19/2023 0435 by Kai Tyler RN  Outcome: Progressing

## 2023-09-19 NOTE — PROGRESS NOTES
Date: 9-19-23 Diabetes Education Follow up  Kerri's nurse, Brittni Valdivia,  is in the room providing care when I entered. Kerri remembered me from yesterday. She is feeling nauseated but agreeable to practice insulin injection with an insulin pen. She viewed the video yesterday and I demonstrated the skill to her today. She was able to show the skill back to me with minimal verbal assistance. We discussed basal /bolus insulin. We reviewed survival skills discussed yesterday. Will sign off. Thank you for the referral.  Alize Alcazar RN     Progress note from yesterday copied below for reference. Inpatient Diabetes Education    Zelda Means was seen for evaluation and education on Type 2 uncontrolled    Lab Results   Component Value Date    LABA1C 12.2 (H) 09/13/2023     Braulio Duckworth states that she has had diabetes for about 20 years. She reports that she has had education in the past. Her knowledge is evident in her responses to me. She reports that her first  had diabetes and she gave him insulin using a syringe/vial. She reports having seen him have low blood sugar emergencies on several occassions and she states that is why she has been hesitant to take insulin herself. She states that she has a PCP who she sees regularly. 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 Braulio Duckworth reports that she has a glucometer at home but has grown weary of checking her BG by that method. She states that she had a CGM sensor in place but that it likely got removed at some point during her hospitalization because she can no longer find it on her arm. Her PCP office placed the sensor and she has a reader. (From what she describes, it is a Babak 2). She said that she would like to eventually learn how to change the sensors herself. We spent some time discussing CGM and I showed her the [place] [employer] Video about sensor application.      She confirmed her home diabetes medications - three oral meds of metformin, glimepiride

## 2023-09-19 NOTE — PROGRESS NOTES
Update given to son Nathan Agrawal and wife via phone. RN updated about pts current condition and dispo planning timeline. Son would like to be contacted at earliest convenience about dispo time frame.
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
## 2023-09-19 NOTE — PLAN OF CARE
Problem: Discharge Planning  Goal: Discharge to home or other facility with appropriate resources  9/19/2023 0435 by Renee Lawrence RN  Outcome: Progressing  9/18/2023 1721 by Tia Boucher RN  Outcome: Progressing     Problem: Pain  Goal: Verbalizes/displays adequate comfort level or baseline comfort level  9/19/2023 0435 by Renee Lawrence RN  Outcome: Progressing  9/18/2023 1721 by iTa Boucher RN  Outcome: Progressing     Problem: ABCDS Injury Assessment  Goal: Absence of physical injury  9/19/2023 0435 by Renee Lawrence RN  Outcome: Progressing  9/18/2023 1721 by Tia Boucher RN  Outcome: Progressing     Problem: Skin/Tissue Integrity  Goal: Absence of new skin breakdown  Description: 1. Monitor for areas of redness and/or skin breakdown  2. Assess vascular access sites hourly  3. Every 4-6 hours minimum:  Change oxygen saturation probe site  4. Every 4-6 hours:  If on nasal continuous positive airway pressure, respiratory therapy assess nares and determine need for appliance change or resting period.   9/19/2023 0435 by Renee Lawrence RN  Outcome: Progressing  9/18/2023 1721 by Tia Boucher RN  Outcome: Progressing     Problem: Safety - Adult  Goal: Free from fall injury  9/19/2023 0435 by Renee Lawrence RN  Outcome: Progressing  9/18/2023 1721 by Tia Boucher RN  Outcome: Progressing     Problem: Chronic Conditions and Co-morbidities  Goal: Patient's chronic conditions and co-morbidity symptoms are monitored and maintained or improved  9/19/2023 0435 by Renee Lawrence RN  Outcome: Progressing  9/18/2023 1721 by Tia Boucher RN  Outcome: Progressing

## 2023-09-19 NOTE — CARE COORDINATION
Clinical, PT, OT, notes faxed to [place] with [facility] per her request. [phone].     [address] with [employer]. Prior Jocelin Ohm  is still pending.

## 2023-09-20 LAB
ANION GAP SERPL CALCULATED.3IONS-SCNC: 10 MMOL/L (ref 9–17)
BASOPHILS # BLD: 0 K/UL (ref 0–0.2)
BASOPHILS NFR BLD: 0 % (ref 0–2)
BUN SERPL-MCNC: 30 MG/DL (ref 8–23)
CALCIUM SERPL-MCNC: 8.4 MG/DL (ref 8.6–10.4)
CHLORIDE SERPL-SCNC: 90 MMOL/L (ref 98–107)
CO2 SERPL-SCNC: 22 MMOL/L (ref 20–31)
CREAT SERPL-MCNC: 0.7 MG/DL (ref 0.5–0.9)
EOSINOPHIL # BLD: 0.11 K/UL (ref 0–0.4)
EOSINOPHILS RELATIVE PERCENT: 1 % (ref 1–4)
ERYTHROCYTE [DISTWIDTH] IN BLOOD BY AUTOMATED COUNT: 19.1 % (ref 11.8–14.4)
GFR SERPL CREATININE-BSD FRML MDRD: >60 ML/MIN/1.73M2
GLUCOSE BLD-MCNC: 106 MG/DL (ref 65–105)
GLUCOSE BLD-MCNC: 112 MG/DL (ref 65–105)
GLUCOSE BLD-MCNC: 228 MG/DL (ref 65–105)
GLUCOSE BLD-MCNC: 244 MG/DL (ref 65–105)
GLUCOSE BLD-MCNC: 283 MG/DL (ref 65–105)
GLUCOSE BLD-MCNC: 289 MG/DL (ref 65–105)
GLUCOSE SERPL-MCNC: 270 MG/DL (ref 70–99)
HCT VFR BLD AUTO: 27.2 % (ref 36.3–47.1)
HGB BLD-MCNC: 8.3 G/DL (ref 11.9–15.1)
IMM GRANULOCYTES # BLD AUTO: 0.11 K/UL (ref 0–0.3)
IMM GRANULOCYTES NFR BLD: 1 %
LYMPHOCYTES NFR BLD: 0.78 K/UL (ref 1–4.8)
LYMPHOCYTES RELATIVE PERCENT: 7 % (ref 24–44)
MCH RBC QN AUTO: 27.3 PG (ref 25.2–33.5)
MCHC RBC AUTO-ENTMCNC: 30.5 G/DL (ref 28.4–34.8)
MCV RBC AUTO: 89.5 FL (ref 82.6–102.9)
MONOCYTES NFR BLD: 0.67 K/UL (ref 0.1–0.8)
MONOCYTES NFR BLD: 6 % (ref 1–7)
MORPHOLOGY: ABNORMAL
MORPHOLOGY: ABNORMAL
NEUTROPHILS NFR BLD: 85 % (ref 36–66)
NEUTS SEG NFR BLD: 9.53 K/UL (ref 1.8–7.7)
NRBC BLD-RTO: 0.4 PER 100 WBC
OSMOLALITY SERPL: 284 MOSM/KG (ref 275–295)
OSMOLALITY UR: 444 MOSM/KG (ref 80–1300)
PLATELET # BLD AUTO: 136 K/UL (ref 138–453)
PMV BLD AUTO: 10.9 FL (ref 8.1–13.5)
POTASSIUM SERPL-SCNC: 4.1 MMOL/L (ref 3.7–5.3)
RBC # BLD AUTO: 3.04 M/UL (ref 3.95–5.11)
SODIUM SERPL-SCNC: 122 MMOL/L (ref 135–144)
SODIUM SERPL-SCNC: 126 MMOL/L (ref 135–144)
SODIUM UR-SCNC: 20 MMOL/L
WBC OTHER # BLD: 11.2 K/UL (ref 3.5–11.3)

## 2023-09-20 PROCEDURE — 97110 THERAPEUTIC EXERCISES: CPT

## 2023-09-20 PROCEDURE — 1200000000 HC SEMI PRIVATE

## 2023-09-20 PROCEDURE — 82947 ASSAY GLUCOSE BLOOD QUANT: CPT

## 2023-09-20 PROCEDURE — 51798 US URINE CAPACITY MEASURE: CPT

## 2023-09-20 PROCEDURE — 99232 SBSQ HOSP IP/OBS MODERATE 35: CPT | Performed by: INTERNAL MEDICINE

## 2023-09-20 PROCEDURE — 36415 COLL VENOUS BLD VENIPUNCTURE: CPT

## 2023-09-20 PROCEDURE — 6370000000 HC RX 637 (ALT 250 FOR IP)

## 2023-09-20 PROCEDURE — 6370000000 HC RX 637 (ALT 250 FOR IP): Performed by: NURSE PRACTITIONER

## 2023-09-20 PROCEDURE — 97535 SELF CARE MNGMENT TRAINING: CPT

## 2023-09-20 PROCEDURE — 6360000002 HC RX W HCPCS: Performed by: STUDENT IN AN ORGANIZED HEALTH CARE EDUCATION/TRAINING PROGRAM

## 2023-09-20 PROCEDURE — 6370000000 HC RX 637 (ALT 250 FOR IP): Performed by: STUDENT IN AN ORGANIZED HEALTH CARE EDUCATION/TRAINING PROGRAM

## 2023-09-20 PROCEDURE — 97530 THERAPEUTIC ACTIVITIES: CPT

## 2023-09-20 PROCEDURE — 99231 SBSQ HOSP IP/OBS SF/LOW 25: CPT | Performed by: SURGERY

## 2023-09-20 PROCEDURE — 6370000000 HC RX 637 (ALT 250 FOR IP): Performed by: INTERNAL MEDICINE

## 2023-09-20 PROCEDURE — 85025 COMPLETE CBC W/AUTO DIFF WBC: CPT

## 2023-09-20 PROCEDURE — 84300 ASSAY OF URINE SODIUM: CPT

## 2023-09-20 PROCEDURE — 83930 ASSAY OF BLOOD OSMOLALITY: CPT

## 2023-09-20 PROCEDURE — 80048 BASIC METABOLIC PNL TOTAL CA: CPT

## 2023-09-20 PROCEDURE — 2580000003 HC RX 258: Performed by: INTERNAL MEDICINE

## 2023-09-20 PROCEDURE — 83935 ASSAY OF URINE OSMOLALITY: CPT

## 2023-09-20 PROCEDURE — 84295 ASSAY OF SERUM SODIUM: CPT

## 2023-09-20 RX ORDER — INSULIN GLARGINE 100 [IU]/ML
55 INJECTION, SOLUTION SUBCUTANEOUS 2 TIMES DAILY
Status: DISCONTINUED | OUTPATIENT
Start: 2023-09-20 | End: 2023-09-21 | Stop reason: HOSPADM

## 2023-09-20 RX ORDER — 0.9 % SODIUM CHLORIDE 0.9 %
500 INTRAVENOUS SOLUTION INTRAVENOUS ONCE
Status: DISCONTINUED | OUTPATIENT
Start: 2023-09-20 | End: 2023-09-21 | Stop reason: HOSPADM

## 2023-09-20 RX ORDER — 0.9 % SODIUM CHLORIDE 0.9 %
500 INTRAVENOUS SOLUTION INTRAVENOUS ONCE
Status: COMPLETED | OUTPATIENT
Start: 2023-09-20 | End: 2023-09-20

## 2023-09-20 RX ADMIN — ALUMINUM HYDROXIDE, MAGNESIUM HYDROXIDE, AND SIMETHICONE 30 ML: 200; 200; 20 SUSPENSION ORAL at 11:28

## 2023-09-20 RX ADMIN — INSULIN LISPRO 4 UNITS: 100 INJECTION, SOLUTION INTRAVENOUS; SUBCUTANEOUS at 11:29

## 2023-09-20 RX ADMIN — GABAPENTIN 300 MG: 300 CAPSULE ORAL at 00:15

## 2023-09-20 RX ADMIN — ACETAMINOPHEN 1000 MG: 500 TABLET ORAL at 20:43

## 2023-09-20 RX ADMIN — Medication 1000 UNITS: at 09:54

## 2023-09-20 RX ADMIN — SODIUM CHLORIDE 500 ML: 9 INJECTION, SOLUTION INTRAVENOUS at 12:42

## 2023-09-20 RX ADMIN — SPIRONOLACTONE 50 MG: 25 TABLET ORAL at 10:04

## 2023-09-20 RX ADMIN — INSULIN LISPRO 8 UNITS: 100 INJECTION, SOLUTION INTRAVENOUS; SUBCUTANEOUS at 06:40

## 2023-09-20 RX ADMIN — ACETAMINOPHEN 1000 MG: 500 TABLET ORAL at 14:02

## 2023-09-20 RX ADMIN — ACETAMINOPHEN 1000 MG: 500 TABLET ORAL at 06:37

## 2023-09-20 RX ADMIN — INSULIN GLARGINE 50 UNITS: 100 INJECTION, SOLUTION SUBCUTANEOUS at 09:54

## 2023-09-20 RX ADMIN — INSULIN GLARGINE 55 UNITS: 100 INJECTION, SOLUTION SUBCUTANEOUS at 21:00

## 2023-09-20 RX ADMIN — GABAPENTIN 300 MG: 300 CAPSULE ORAL at 09:54

## 2023-09-20 RX ADMIN — ENOXAPARIN SODIUM 30 MG: 100 INJECTION SUBCUTANEOUS at 09:53

## 2023-09-20 RX ADMIN — GABAPENTIN 300 MG: 300 CAPSULE ORAL at 17:09

## 2023-09-20 RX ADMIN — ENOXAPARIN SODIUM 30 MG: 100 INJECTION SUBCUTANEOUS at 20:43

## 2023-09-20 RX ADMIN — ALOGLIPTIN 12.5 MG: 12.5 TABLET, FILM COATED ORAL at 09:54

## 2023-09-20 ASSESSMENT — PAIN DESCRIPTION - LOCATION: LOCATION: CHEST

## 2023-09-20 ASSESSMENT — PAIN DESCRIPTION - ORIENTATION: ORIENTATION: RIGHT

## 2023-09-20 ASSESSMENT — PAIN SCALES - GENERAL: PAINLEVEL_OUTOF10: 7
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
## 2023-09-20 NOTE — PLAN OF CARE
Problem: Discharge Planning  Goal: Discharge to home or other facility with appropriate resources  9/20/2023 1625 by Patrick Matias RN  Outcome: Progressing  9/20/2023 0430 by Romel Gardiner RN  Outcome: Progressing     Problem: Pain  Goal: Verbalizes/displays adequate comfort level or baseline comfort level  9/20/2023 1625 by Patrick Matias RN  Outcome: Progressing  9/20/2023 0430 by Romel Gardiner RN  Outcome: Progressing     Problem: ABCDS Injury Assessment  Goal: Absence of physical injury  9/20/2023 1625 by Patrick Matias RN  Outcome: Progressing  9/20/2023 0430 by Romel Gardiner RN  Outcome: Progressing     Problem: Skin/Tissue Integrity  Goal: Absence of new skin breakdown  Description: 1. Monitor for areas of redness and/or skin breakdown  2. Assess vascular access sites hourly  3. Every 4-6 hours minimum:  Change oxygen saturation probe site  4. Every 4-6 hours:  If on nasal continuous positive airway pressure, respiratory therapy assess nares and determine need for appliance change or resting period.   9/20/2023 1625 by Patrick Matias RN  Outcome: Progressing  9/20/2023 0430 by Romel Gardiner RN  Outcome: Progressing     Problem: Safety - Adult  Goal: Free from fall injury  9/20/2023 1625 by Patrick Matias RN  Outcome: Progressing  9/20/2023 0430 by Romel Gardiner RN  Outcome: Progressing     Problem: Chronic Conditions and Co-morbidities  Goal: Patient's chronic conditions and co-morbidity symptoms are monitored and maintained or improved  9/20/2023 1625 by Patrick Matias RN  Outcome: Progressing  9/20/2023 0430 by Romel Gardiner RN  Outcome: Progressing

## 2023-09-20 NOTE — CARE COORDINATION
Transitional Planning  Call from Nelly at [facility], states they did receive a denial.  P2P has been scheduled with [facility] medical director 9-11 am. She will call  once P2P has been completed. 1150 am Call from Nelly at [facility], P2P was overturned. Zeenat needs PT/OT notes. Called PT, spoke with Kirby Clarke, plan on seeing patient today, but may take a few hours. Called OT, spoke with Rosey Davis, he will notified OT. Spoke with Nelly at [facility], states that she would need the notes by 4 pm and patient would have to leave [facility] by 6 pm.      Called [facility], spoke with Kaylyn, information faxed and she will call around. 1216 pm Call from [facility], stating patient is not being discharged today. Called [facility], spoke with Kaylyn update given. Called Zeenat at [facility] notified of no dc today.

## 2023-09-20 NOTE — PLAN OF CARE
Problem: Discharge Planning  Goal: Discharge to home or other facility with appropriate resources  9/20/2023 0430 by Juventino Rice RN  Outcome: Progressing  9/19/2023 1815 by Antoine Pulido RN  Outcome: Progressing     Problem: Pain  Goal: Verbalizes/displays adequate comfort level or baseline comfort level  9/20/2023 0430 by Juventino Rice RN  Outcome: Progressing  9/19/2023 1815 by Antoine Pulido RN  Outcome: Progressing     Problem: ABCDS Injury Assessment  Goal: Absence of physical injury  9/20/2023 0430 by Juventino Rice RN  Outcome: Progressing  9/19/2023 1815 by Antoine Pulido RN  Outcome: Progressing     Problem: Skin/Tissue Integrity  Goal: Absence of new skin breakdown  Description: 1. Monitor for areas of redness and/or skin breakdown  2. Assess vascular access sites hourly  3. Every 4-6 hours minimum:  Change oxygen saturation probe site  4. Every 4-6 hours:  If on nasal continuous positive airway pressure, respiratory therapy assess nares and determine need for appliance change or resting period.   9/20/2023 0430 by Juevntino Rice RN  Outcome: Progressing  9/19/2023 1815 by Antoine Pulido RN  Outcome: Progressing     Problem: Safety - Adult  Goal: Free from fall injury  9/20/2023 0430 by Juventino Rice RN  Outcome: Progressing  9/19/2023 1815 by Antoine Pulido RN  Outcome: Progressing     Problem: Chronic Conditions and Co-morbidities  Goal: Patient's chronic conditions and co-morbidity symptoms are monitored and maintained or improved  9/20/2023 0430 by Juventino Rice RN  Outcome: Progressing  9/19/2023 1815 by Antoine Pulido RN  Outcome: Progressing
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
## 2023-09-21 VITALS
BODY MASS INDEX: 22.19 KG/M2 | HEIGHT: 60 IN | OXYGEN SATURATION: 92 % | TEMPERATURE: 97.6 F | RESPIRATION RATE: 20 BRPM | SYSTOLIC BLOOD PRESSURE: 131 MMHG | WEIGHT: 113 LBS | DIASTOLIC BLOOD PRESSURE: 60 MMHG | HEART RATE: 89 BPM

## 2023-09-21 LAB
ANION GAP SERPL CALCULATED.3IONS-SCNC: 12 MMOL/L (ref 9–17)
BASOPHILS # BLD: 0 K/UL (ref 0–0.2)
BASOPHILS NFR BLD: 0 % (ref 0–2)
BUN SERPL-MCNC: 32 MG/DL (ref 8–23)
CALCIUM SERPL-MCNC: 8.5 MG/DL (ref 8.6–10.4)
CHLORIDE SERPL-SCNC: 93 MMOL/L (ref 98–107)
CO2 SERPL-SCNC: 23 MMOL/L (ref 20–31)
CREAT SERPL-MCNC: 0.7 MG/DL (ref 0.5–0.9)
EOSINOPHIL # BLD: 0.38 K/UL (ref 0–0.4)
EOSINOPHILS RELATIVE PERCENT: 3 % (ref 1–4)
ERYTHROCYTE [DISTWIDTH] IN BLOOD BY AUTOMATED COUNT: 20.3 % (ref 11.8–14.4)
GFR SERPL CREATININE-BSD FRML MDRD: >60 ML/MIN/1.73M2
GLUCOSE BLD-MCNC: 117 MG/DL (ref 65–105)
GLUCOSE BLD-MCNC: 145 MG/DL (ref 65–105)
GLUCOSE SERPL-MCNC: 142 MG/DL (ref 70–99)
HCT VFR BLD AUTO: 28 % (ref 36.3–47.1)
HGB BLD-MCNC: 8.5 G/DL (ref 11.9–15.1)
IMM GRANULOCYTES # BLD AUTO: 0.13 K/UL (ref 0–0.3)
IMM GRANULOCYTES NFR BLD: 1 %
LYMPHOCYTES NFR BLD: 0.77 K/UL (ref 1–4.8)
LYMPHOCYTES RELATIVE PERCENT: 6 % (ref 24–44)
MCH RBC QN AUTO: 27.5 PG (ref 25.2–33.5)
MCHC RBC AUTO-ENTMCNC: 30.4 G/DL (ref 28.4–34.8)
MCV RBC AUTO: 90.6 FL (ref 82.6–102.9)
MONOCYTES NFR BLD: 0.77 K/UL (ref 0.1–0.8)
MONOCYTES NFR BLD: 6 % (ref 1–7)
MORPHOLOGY: ABNORMAL
MORPHOLOGY: ABNORMAL
NEUTROPHILS NFR BLD: 84 % (ref 36–66)
NEUTS SEG NFR BLD: 10.75 K/UL (ref 1.8–7.7)
NRBC BLD-RTO: 0.3 PER 100 WBC
NUCLEATED RED BLOOD CELLS: 1 PER 100 WBC
PLATELET # BLD AUTO: 120 K/UL (ref 138–453)
PMV BLD AUTO: 10.1 FL (ref 8.1–13.5)
POTASSIUM SERPL-SCNC: 4.6 MMOL/L (ref 3.7–5.3)
RBC # BLD AUTO: 3.09 M/UL (ref 3.95–5.11)
RBC # BLD: ABNORMAL 10*6/UL
SODIUM SERPL-SCNC: 128 MMOL/L (ref 135–144)
WBC OTHER # BLD: 12.8 K/UL (ref 3.5–11.3)

## 2023-09-21 PROCEDURE — 85025 COMPLETE CBC W/AUTO DIFF WBC: CPT

## 2023-09-21 PROCEDURE — 6360000002 HC RX W HCPCS: Performed by: STUDENT IN AN ORGANIZED HEALTH CARE EDUCATION/TRAINING PROGRAM

## 2023-09-21 PROCEDURE — 99239 HOSP IP/OBS DSCHRG MGMT >30: CPT | Performed by: SURGERY

## 2023-09-21 PROCEDURE — 2580000003 HC RX 258: Performed by: STUDENT IN AN ORGANIZED HEALTH CARE EDUCATION/TRAINING PROGRAM

## 2023-09-21 PROCEDURE — 6370000000 HC RX 637 (ALT 250 FOR IP): Performed by: NURSE PRACTITIONER

## 2023-09-21 PROCEDURE — 80048 BASIC METABOLIC PNL TOTAL CA: CPT

## 2023-09-21 PROCEDURE — 82947 ASSAY GLUCOSE BLOOD QUANT: CPT

## 2023-09-21 PROCEDURE — 36415 COLL VENOUS BLD VENIPUNCTURE: CPT

## 2023-09-21 PROCEDURE — 6370000000 HC RX 637 (ALT 250 FOR IP): Performed by: STUDENT IN AN ORGANIZED HEALTH CARE EDUCATION/TRAINING PROGRAM

## 2023-09-21 RX ORDER — ALOGLIPTIN 12.5 MG/1
12.5 TABLET, FILM COATED ORAL DAILY
Qty: 60 TABLET | Refills: 2
Start: 2023-09-22

## 2023-09-21 RX ORDER — INSULIN GLARGINE 100 [IU]/ML
55 INJECTION, SOLUTION SUBCUTANEOUS 2 TIMES DAILY
Qty: 10 ML | Refills: 3
Start: 2023-09-21

## 2023-09-21 RX ORDER — GABAPENTIN 300 MG/1
300 CAPSULE ORAL 3 TIMES DAILY
Qty: 30 CAPSULE | Refills: 0
Start: 2023-09-21 | End: 2023-10-01

## 2023-09-21 RX ORDER — METHOCARBAMOL 750 MG/1
750 TABLET, FILM COATED ORAL 3 TIMES DAILY
Qty: 30 TABLET | Refills: 0
Start: 2023-09-21 | End: 2023-10-01

## 2023-09-21 RX ORDER — 0.9 % SODIUM CHLORIDE 0.9 %
500 INTRAVENOUS SOLUTION INTRAVENOUS ONCE
Status: COMPLETED | OUTPATIENT
Start: 2023-09-21 | End: 2023-09-21

## 2023-09-21 RX ORDER — LIDOCAINE 4 G/G
1 PATCH TOPICAL DAILY
Qty: 7 EACH | Refills: 0
Start: 2023-09-22 | End: 2023-09-29

## 2023-09-21 RX ORDER — ERGOCALCIFEROL 1.25 MG/1
50000 CAPSULE ORAL WEEKLY
Qty: 12 CAPSULE | Refills: 1
Start: 2023-09-21

## 2023-09-21 RX ADMIN — GABAPENTIN 300 MG: 300 CAPSULE ORAL at 00:15

## 2023-09-21 RX ADMIN — GABAPENTIN 300 MG: 300 CAPSULE ORAL at 08:21

## 2023-09-21 RX ADMIN — SPIRONOLACTONE 50 MG: 25 TABLET ORAL at 08:21

## 2023-09-21 RX ADMIN — ENOXAPARIN SODIUM 30 MG: 100 INJECTION SUBCUTANEOUS at 08:21

## 2023-09-21 RX ADMIN — SODIUM CHLORIDE 500 ML: 9 INJECTION, SOLUTION INTRAVENOUS at 08:44

## 2023-09-21 RX ADMIN — ALOGLIPTIN 12.5 MG: 12.5 TABLET, FILM COATED ORAL at 08:21

## 2023-09-21 NOTE — CARE COORDINATION
Transitional Planning  [place], [phone], message left for [facility] , regarding patient's admission    [facility] [phone] [facility], she will call around for transport. 836 am Call from [phone] [place],  time is 10 am.      Update given to [facility]. 848 am Call from [facility], able to accept patient today. Report number [phone], fax [phone]. Update given to patient, she will call family. 930 am Faxed MONI and MAR to number provided. Discharge [address] Case Management Department  Written by: Norah Mcdonough RN    Patient Name: Suzanna Diaz  Attending Provider: Lalo Garibay MD  Admit Date: 2023 10:22 AM  MRN: [id number]  Account: [de-identified]                     : 1945  Discharge Date:       Disposition: [facility].       Norah Mcdonough RN

## 2023-09-21 NOTE — PLAN OF CARE
Problem: Discharge Planning  Goal: Discharge to home or other facility with appropriate resources  9/20/2023 2343 by Michaela Eller  Outcome: Progressing  9/20/2023 2321 by Michaela Eller  Outcome: Progressing  9/20/2023 1625 by Jona Castellanos RN  Outcome: Progressing     Problem: Pain  Goal: Verbalizes/displays adequate comfort level or baseline comfort level  9/20/2023 2343 by Michaela Eller  Outcome: Progressing  9/20/2023 2321 by Michaela Eller  Outcome: Progressing  9/20/2023 1625 by Jona Castellanos RN  Outcome: Progressing     Problem: ABCDS Injury Assessment  Goal: Absence of physical injury  9/20/2023 2343 by Michaela Eller  Outcome: Progressing  9/20/2023 2321 by Michaela Eller  Outcome: Progressing  9/20/2023 1625 by Jona Castellanos RN  Outcome: Progressing     Problem: Skin/Tissue Integrity  Goal: Absence of new skin breakdown  Description: 1. Monitor for areas of redness and/or skin breakdown  2. Assess vascular access sites hourly  3. Every 4-6 hours minimum:  Change oxygen saturation probe site  4. Every 4-6 hours:  If on nasal continuous positive airway pressure, respiratory therapy assess nares and determine need for appliance change or resting period.   9/20/2023 2343 by Michaela Eller  Outcome: Progressing  9/20/2023 2321 by Michaela Eller  Outcome: Progressing  9/20/2023 1625 by Jona Castellanos RN  Outcome: Progressing     Problem: Safety - Adult  Goal: Free from fall injury  9/20/2023 2343 by Michaela Eller  Outcome: Progressing  9/20/2023 2321 by Michaela Eller  Outcome: Progressing  9/20/2023 1625 by Jona Castellanos RN  Outcome: Progressing

## 2023-09-21 NOTE — PLAN OF CARE
Problem: Discharge Planning  Goal: Discharge to home or other facility with appropriate resources  9/20/2023 2321 by Evan Browning  Outcome: Progressing  9/20/2023 1625 by Vince Cavanaugh RN  Outcome: Progressing     Problem: Pain  Goal: Verbalizes/displays adequate comfort level or baseline comfort level  9/20/2023 2321 by Evan Browning  Outcome: Progressing  9/20/2023 1625 by Vince Cavanaugh RN  Outcome: Progressing     Problem: ABCDS Injury Assessment  Goal: Absence of physical injury  9/20/2023 2321 by Evan Browning  Outcome: Progressing  9/20/2023 1625 by Vince Cavanaugh RN  Outcome: Progressing     Problem: Skin/Tissue Integrity  Goal: Absence of new skin breakdown  Description: 1. Monitor for areas of redness and/or skin breakdown  2. Assess vascular access sites hourly  3. Every 4-6 hours minimum:  Change oxygen saturation probe site  4. Every 4-6 hours:  If on nasal continuous positive airway pressure, respiratory therapy assess nares and determine need for appliance change or resting period.   9/20/2023 2321 by Evan Browning  Outcome: Progressing  9/20/2023 1625 by Vince Cavanaugh RN  Outcome: Progressing     Problem: Safety - Adult  Goal: Free from fall injury  9/20/2023 2321 by Evan Browning  Outcome: Progressing  9/20/2023 1625 by Vince Cavanaugh RN  Outcome: Progressing     Problem: Chronic Conditions and Co-morbidities  Goal: Patient's chronic conditions and co-morbidity symptoms are monitored and maintained or improved  9/20/2023 1625 by Vince Cavanaugh RN  Outcome: Progressing

## 2023-09-21 NOTE — PLAN OF CARE
Problem: Discharge Planning  Goal: Discharge to home or other facility with appropriate resources  9/21/2023 0954 by Ronnie Ferguson RN  Outcome: Adequate for Discharge  9/20/2023 2343 by Nishant Cruz  Outcome: Progressing  9/20/2023 2321 by Nishant Cruz  Outcome: Progressing     Problem: Pain  Goal: Verbalizes/displays adequate comfort level or baseline comfort level  9/21/2023 0954 by Ronnie Ferguson RN  Outcome: Adequate for Discharge  9/20/2023 2343 by Nishant Cruz  Outcome: Progressing  9/20/2023 2321 by Nishant Cruz  Outcome: Progressing     Problem: ABCDS Injury Assessment  Goal: Absence of physical injury  9/21/2023 0954 by Ronnie Ferguson RN  Outcome: Adequate for Discharge  9/20/2023 2343 by Nishant Cruz  Outcome: Progressing  9/20/2023 2321 by Nishant Cruz  Outcome: Progressing     Problem: Skin/Tissue Integrity  Goal: Absence of new skin breakdown  Description: 1. Monitor for areas of redness and/or skin breakdown  2. Assess vascular access sites hourly  3. Every 4-6 hours minimum:  Change oxygen saturation probe site  4. Every 4-6 hours:  If on nasal continuous positive airway pressure, respiratory therapy assess nares and determine need for appliance change or resting period.   9/21/2023 0954 by Ronnie Ferguson RN  Outcome: Adequate for Discharge  9/20/2023 2343 by Nishant Cruz  Outcome: Progressing  9/20/2023 2321 by Nishant Cruz  Outcome: Progressing     Problem: Safety - Adult  Goal: Free from fall injury  9/21/2023 0954 by Ronnie Ferguson RN  Outcome: Adequate for Discharge  9/20/2023 2343 by Nishant Cruz  Outcome: Progressing  9/20/2023 2321 by Nishant Cruz  Outcome: Progressing     Problem: Chronic Conditions and Co-morbidities  Goal: Patient's chronic conditions and co-morbidity symptoms are monitored and maintained or improved  9/21/2023 0954 by Ronnie Ferguson RN  Outcome: Adequate for Discharge  9/20/2023 2343 by Nishant Cruz  Outcome: Adequate for Discharge

## 2023-09-21 NOTE — DISCHARGE SUMMARY
Pt. discharged safely with medical transport. Pt. Discharged with all personal belongings. Kervin Hidalgo RN called report to RN at [facility].

## 2023-09-21 NOTE — PROGRESS NOTES
[facility]) Inpatient Discharge Summary  Farheen Holley  9/21/2023        Lisette Mehta  1945  [id number]    Date & Time of Discharge: 9/21/2023 10:00 AM       Services provided:  Screenings: ITSS, PC-PTSD-5, and PHQ-4 and Informational Packet Provided    Screen Results (If any):    ITSS:  Date Screen Completed: 9/15/23  Patient's score= 0 for PTSD risk. ( ? 2 is positive for PTSD risk. )  Patient's score= 0 for depression risk. ( ? 2 is positive for Depression risk )     PC-PTSD-5:  Date Screen Completed: 9/15/23  Patient's Score = 0  ( > 3 is positive for PTSD )     PHQ-4:   Date Screen Completed: 9/15/23  Patient's Score (Questions 1&2):0  ? 3 suggests anxiety  Patient's Score (Questions 3&4):0  ? 3 suggests depression         Discharge Recommendations:  No outpatient mental health services recommended      The therapist may be reached through the [address] [place] [address] offices at [phone].

## 2023-09-21 NOTE — DISCHARGE SUMMARY
utilized to reduce the radiation dose to as low as reasonably achievable.; CT of the cervical spine was performed without the administration of intravenous contrast. Multiplanar reformatted images are provided for review. Automated exposure control, iterative reconstruction, and/or weight based adjustment of the mA/kV was utilized to reduce the radiation dose to as low as reasonably achievable. COMPARISON: None. HISTORY: ORDERING SYSTEM PROVIDED HISTORY: [facility] TECHNOLOGIST PROVIDED HISTORY: [facility] Decision Support Exception - unselect if not a suspected or confirmed emergency medical condition->Emergency Medical Condition (MA) FINDINGS: HEAD: BRAIN/VENTRICLES: Mild cerebral atrophy. No midline shift. Basal cisterns are normally outlined. No acute intra or extra-axial hemorrhage. No acute infarct. ORBITS: The visualized portion of the orbits demonstrate no acute abnormality. SINUSES: The visualized paranasal sinuses and mastoid air cells demonstrate no acute abnormality. SOFT TISSUES/SKULL: No acute abnormality of the visualized skull or soft tissues. CERVICAL SPINE: BONES/ALIGNMENT: There is no evidence of an acute cervical spine fracture. No traumatic malalignment. DEGENERATIVE CHANGES: Mild diffuse degenerative changes. SOFT TISSUES: There is no prevertebral soft tissue swelling. CT HEAD: No CT evidence for acute intracranial abnormality. . Mild cerebral atrophy. CT CERVICAL SPINE: No acute fracture or subluxation of the cervical spine. Mild diffuse degenerative changes. XR HIP 2-3 VW W PELVIS LEFT    Result Date: 9/13/2023  EXAMINATION: ONE XRAY VIEW OF THE PELVIS AND TWO XRAY VIEWS LEFT HIP 9/13/2023 10:55 am COMPARISON: None. HISTORY: ORDERING SYSTEM PROVIDED HISTORY: [facility] TECHNOLOGIST PROVIDED HISTORY: [facility] FINDINGS: Comminuted, displaced intertrochanteric left femur fracture with involvement of the lesser trochanter. Pelvic alignment is maintained. No dislocation. MEDICAL DECISION MAKING AND PLAN  Discharge to SNF    Evelyn Chamberlain MD  9/21/2023  11:58 AM

## 2023-09-26 ENCOUNTER — TELEPHONE (OUTPATIENT)
Dept: ORTHOPEDIC SURGERY | Age: 78
End: 2023-09-26

## 2023-09-26 NOTE — TELEPHONE ENCOUNTER
Earlene with [facility] called to cancel 1st post op on 9/29 as patient will still be admitted. Ben Putnam is requesting an order to remove staples and can do Xray if needed as well. Call back number [phone]- Fax number [phone].

## 2023-09-26 NOTE — TELEPHONE ENCOUNTER
Returned call to [facility]. She will reach out to family for transport options. States they do not provide transport to an appointment for something they can do there at the hospital. [facility] will return call tomorrow after she speaks to family.

## 2023-10-04 ENCOUNTER — OFFICE VISIT (OUTPATIENT)
Dept: ORTHOPEDIC SURGERY | Age: 78
End: 2023-10-04

## 2023-10-04 VITALS — WEIGHT: 113 LBS | HEIGHT: 59 IN | BODY MASS INDEX: 22.78 KG/M2

## 2023-10-04 DIAGNOSIS — S72.143S INTERTROCHANTERIC FRACTURE OF FEMUR, SEQUELA: Primary | ICD-10-CM

## 2023-10-13 PROBLEM — Z01.810 PRE-OPERATIVE CARDIOVASCULAR EXAMINATION: Status: RESOLVED | Noted: 2023-09-13 | Resolved: 2023-10-13

## 2023-11-02 ENCOUNTER — OFFICE VISIT (OUTPATIENT)
Dept: ORTHOPEDIC SURGERY | Age: 78
End: 2023-11-02

## 2023-11-02 VITALS — WEIGHT: 113 LBS | BODY MASS INDEX: 22.78 KG/M2 | HEIGHT: 59 IN

## 2023-11-02 DIAGNOSIS — S72.143S INTERTROCHANTERIC FRACTURE OF FEMUR, SEQUELA: Primary | ICD-10-CM

## 2024-11-19 ENCOUNTER — TELEPHONE (OUTPATIENT)
Dept: ORTHOPEDIC SURGERY | Age: 79
End: 2024-11-19

## 2024-11-19 NOTE — TELEPHONE ENCOUNTER
Notified patient that she only needs to let them know that she had a surgery, when it was and where and she will be okay to travel

## 2024-11-19 NOTE — TELEPHONE ENCOUNTER
Patient called in regarding documentation to clear airport for upcoming trip April 2025. Please call to discuss    Surgery in 2023.    Thank you

## 2025-06-25 NOTE — PROGRESS NOTES
Subjective     HPI  Joceline Lubin is a 79 y.o. 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  She has a history of cirrhosis and has been getting ultrasounds.  Most recent ultrasound showed liver lesions.  This was followed by MRI which I have reviewed that shows 2 liver lesions with early enhancement and delayed washout concerning for hepatocellular carcinoma.  There is also evidence of liver cirrhosis and portal venous hypertension.  I noted in the chart that an AFP had been ordered and after the patient had already left the office upon trying to obtain these records I learned that these had not been drawn.    She is here today with her friend.    Review of Systems   Constitutional: Negative.    HENT:  Negative for ear discharge, nosebleeds and sore throat.    Eyes:  Negative for discharge.   Respiratory:  Negative for shortness of breath.    Cardiovascular:  Negative for chest pain.   Gastrointestinal:  Negative for abdominal pain, nausea and vomiting.   Endocrine: Negative.    Genitourinary:  Negative for dysuria and flank pain.   Musculoskeletal:  Negative for gait problem.   Skin:  Negative for rash.   Neurological:  Negative for speech difficulty, weakness and headaches.   Hematological:  Negative for adenopathy.   Psychiatric/Behavioral:  Negative for behavioral problems, confusion and hallucinations.         Medical History[1]   Surgical History[2]   Medications Ordered Prior to Encounter[3]   RX Allergies[4]   Social History     Socioeconomic History    Marital status:      Spouse name: Not on file    Number of children: Not on file    Years of education: Not on file    Highest education level: Not on file   Occupational History    Not on file   Tobacco Use    Smoking status: Not on file    Smokeless tobacco: Not on file   Substance and Sexual Activity    Alcohol use: Not on file    Drug use: Not on file    Sexual activity: Not on file   Other Topics Concern    Not on file   Social  History Narrative    Not on file     Social Drivers of Health     Financial Resource Strain: Not on file   Food Insecurity: Not on file   Transportation Needs: Not on file   Physical Activity: Not on file   Stress: Not on file   Social Connections: Not on file   Intimate Partner Violence: Not on file   Housing Stability: Not on file      Family History[5]       Objective     Vitals:    06/26/25 1233   BP: 149/76   Pulse: 79   Resp: 16   Temp: 36.9 °C (98.4 °F)   SpO2: 94%          Physical Exam  Constitutional:       Appearance: Normal appearance.   HENT:      Head: Atraumatic.      Nose: Nose normal.   Eyes:      Extraocular Movements: Extraocular movements intact.      Conjunctiva/sclera: Conjunctivae normal.   Cardiovascular:      Rate and Rhythm: Normal rate.   Pulmonary:      Effort: Pulmonary effort is normal.   Abdominal:      Palpations: Abdomen is soft.      Tenderness: There is no abdominal tenderness.   Musculoskeletal:         General: Normal range of motion.   Skin:     Findings: No rash.   Neurological:      General: No focal deficit present.      Mental Status: She is alert.   Psychiatric:         Behavior: Behavior normal.        === 06/03/25 ===    MR ABDOMEN W AND WO CONTRAST    Assessment/Plan     79-year-old woman with imaging findings of cirrhosis with portal venous hypertension and multifocal hepatocellular carcinoma.  I discussed with her that surgical resection and ablation are not an option given the location of the lesions.  We discussed that chemotherapy is a consideration in addition to local regional therapies.  She states very adamantly that she does not want any chemotherapy and in fact it sounds like she was referred to an oncologist locally and canceled that appointment.  We discussed that AFP level would be required to confirm the diagnosis.  If the AFP is not elevated then a biopsy would be required to confirm the diagnosis.  Once we have confirmed the diagnosis, she is amenable to  meeting with Dr. Garza from interventional radiology to discuss Y90 therapy.    Jb Rosenbaum MD           [1] No past medical history on file.  [2] No past surgical history on file.  [3]   No current outpatient medications on file prior to visit.     No current facility-administered medications on file prior to visit.   [4] Not on File  [5] No family history on file.

## 2025-06-26 ENCOUNTER — OFFICE VISIT (OUTPATIENT)
Dept: SURGICAL ONCOLOGY | Facility: CLINIC | Age: 80
End: 2025-06-26
Payer: MEDICARE

## 2025-06-26 VITALS
TEMPERATURE: 98.4 F | WEIGHT: 129.63 LBS | HEART RATE: 79 BPM | OXYGEN SATURATION: 94 % | RESPIRATION RATE: 16 BRPM | DIASTOLIC BLOOD PRESSURE: 76 MMHG | SYSTOLIC BLOOD PRESSURE: 149 MMHG

## 2025-06-26 DIAGNOSIS — K76.9 LIVER LESION: ICD-10-CM

## 2025-06-26 DIAGNOSIS — C22.9 MALIGNANT NEOPLASM OF LIVER, UNSPECIFIED LIVER MALIGNANCY TYPE (MULTI): ICD-10-CM

## 2025-06-26 PROCEDURE — 1036F TOBACCO NON-USER: CPT | Performed by: SURGERY

## 2025-06-26 PROCEDURE — 1159F MED LIST DOCD IN RCRD: CPT | Performed by: SURGERY

## 2025-06-26 PROCEDURE — 99205 OFFICE O/P NEW HI 60 MIN: CPT | Performed by: SURGERY

## 2025-06-26 PROCEDURE — 1126F AMNT PAIN NOTED NONE PRSNT: CPT | Performed by: SURGERY

## 2025-06-26 PROCEDURE — 99215 OFFICE O/P EST HI 40 MIN: CPT | Performed by: SURGERY

## 2025-06-26 RX ORDER — OMEPRAZOLE 40 MG/1
CAPSULE, DELAYED RELEASE ORAL DAILY
COMMUNITY

## 2025-06-26 RX ORDER — METFORMIN HYDROCHLORIDE 500 MG/1
500 TABLET, EXTENDED RELEASE ORAL NIGHTLY
COMMUNITY

## 2025-06-26 RX ORDER — INSULIN GLARGINE 100 [IU]/ML
INJECTION, SOLUTION SUBCUTANEOUS
COMMUNITY

## 2025-06-26 RX ORDER — LORAZEPAM 0.5 MG/1
1 TABLET ORAL 2 TIMES DAILY PRN
COMMUNITY
Start: 2025-05-23

## 2025-06-26 RX ORDER — GLIMEPIRIDE 4 MG/1
4 TABLET ORAL 2 TIMES DAILY
COMMUNITY

## 2025-06-26 RX ORDER — LOSARTAN POTASSIUM 100 MG/1
TABLET ORAL
COMMUNITY
Start: 2023-07-09

## 2025-06-26 ASSESSMENT — PAIN SCALES - GENERAL: PAINLEVEL_OUTOF10: 0-NO PAIN

## 2025-06-26 ASSESSMENT — ENCOUNTER SYMPTOMS
LOSS OF SENSATION IN FEET: 0
NAUSEA: 0
HEADACHES: 0
DYSURIA: 0
SORE THROAT: 0
DEPRESSION: 0
HALLUCINATIONS: 0
SHORTNESS OF BREATH: 0
FLANK PAIN: 0
OCCASIONAL FEELINGS OF UNSTEADINESS: 0
WEAKNESS: 0
VOMITING: 0
ADENOPATHY: 0
ENDOCRINE NEGATIVE: 1
SPEECH DIFFICULTY: 0
ABDOMINAL PAIN: 0
CONSTITUTIONAL NEGATIVE: 1
CONFUSION: 0
EYE DISCHARGE: 0

## 2025-06-26 ASSESSMENT — PATIENT HEALTH QUESTIONNAIRE - PHQ9
1. LITTLE INTEREST OR PLEASURE IN DOING THINGS: NOT AT ALL
SUM OF ALL RESPONSES TO PHQ9 QUESTIONS 1 AND 2: 0
2. FEELING DOWN, DEPRESSED OR HOPELESS: NOT AT ALL

## 2025-06-26 ASSESSMENT — COLUMBIA-SUICIDE SEVERITY RATING SCALE - C-SSRS
2. HAVE YOU ACTUALLY HAD ANY THOUGHTS OF KILLING YOURSELF?: NO
6. HAVE YOU EVER DONE ANYTHING, STARTED TO DO ANYTHING, OR PREPARED TO DO ANYTHING TO END YOUR LIFE?: NO
1. IN THE PAST MONTH, HAVE YOU WISHED YOU WERE DEAD OR WISHED YOU COULD GO TO SLEEP AND NOT WAKE UP?: NO

## 2025-07-08 LAB — Lab: 4.8 NG/ML (ref 0–9.2)

## 2025-07-11 DIAGNOSIS — C22.9 MALIGNANT NEOPLASM OF LIVER, UNSPECIFIED LIVER MALIGNANCY TYPE (MULTI): ICD-10-CM

## 2025-07-28 ENCOUNTER — HOSPITAL ENCOUNTER (OUTPATIENT)
Dept: RADIOLOGY | Facility: HOSPITAL | Age: 80
Discharge: HOME | End: 2025-07-28
Payer: MEDICARE

## 2025-07-28 VITALS
DIASTOLIC BLOOD PRESSURE: 74 MMHG | HEART RATE: 88 BPM | RESPIRATION RATE: 18 BRPM | SYSTOLIC BLOOD PRESSURE: 163 MMHG | WEIGHT: 124 LBS | HEIGHT: 60 IN | BODY MASS INDEX: 24.35 KG/M2 | TEMPERATURE: 97.3 F | OXYGEN SATURATION: 95 %

## 2025-07-28 DIAGNOSIS — C22.9 MALIGNANT NEOPLASM OF LIVER, UNSPECIFIED LIVER MALIGNANCY TYPE (MULTI): ICD-10-CM

## 2025-07-28 LAB
ERYTHROCYTE [DISTWIDTH] IN BLOOD BY AUTOMATED COUNT: 13.8 % (ref 11.5–14.5)
GLUCOSE BLD MANUAL STRIP-MCNC: 299 MG/DL (ref 74–99)
HCT VFR BLD AUTO: 33 % (ref 36–46)
HGB BLD-MCNC: 11.4 G/DL (ref 12–16)
INR PPP: 1.2 (ref 0.9–1.1)
MCH RBC QN AUTO: 30.3 PG (ref 26–34)
MCHC RBC AUTO-ENTMCNC: 34.5 G/DL (ref 32–36)
MCV RBC AUTO: 88 FL (ref 80–100)
NRBC BLD-RTO: 0 /100 WBCS (ref 0–0)
PLATELET # BLD AUTO: 101 X10*3/UL (ref 150–450)
PROTHROMBIN TIME: 13.1 SECONDS (ref 9.8–12.4)
RBC # BLD AUTO: 3.76 X10*6/UL (ref 4–5.2)
WBC # BLD AUTO: 4.8 X10*3/UL (ref 4.4–11.3)

## 2025-07-28 PROCEDURE — 88341 IMHCHEM/IMCYTCHM EA ADD ANTB: CPT | Mod: TC,STJLAB | Performed by: SURGERY

## 2025-07-28 PROCEDURE — 99152 MOD SED SAME PHYS/QHP 5/>YRS: CPT | Performed by: RADIOLOGY

## 2025-07-28 PROCEDURE — 82947 ASSAY GLUCOSE BLOOD QUANT: CPT

## 2025-07-28 PROCEDURE — 2500000004 HC RX 250 GENERAL PHARMACY W/ HCPCS (ALT 636 FOR OP/ED): Performed by: RADIOLOGY

## 2025-07-28 PROCEDURE — 2500000005 HC RX 250 GENERAL PHARMACY W/O HCPCS: Performed by: RADIOLOGY

## 2025-07-28 PROCEDURE — 7100000009 HC PHASE TWO TIME - INITIAL BASE CHARGE

## 2025-07-28 PROCEDURE — 36415 COLL VENOUS BLD VENIPUNCTURE: CPT | Performed by: RADIOLOGY

## 2025-07-28 PROCEDURE — 85027 COMPLETE CBC AUTOMATED: CPT | Performed by: RADIOLOGY

## 2025-07-28 PROCEDURE — 99152 MOD SED SAME PHYS/QHP 5/>YRS: CPT

## 2025-07-28 PROCEDURE — 85610 PROTHROMBIN TIME: CPT | Performed by: RADIOLOGY

## 2025-07-28 PROCEDURE — 47000 NEEDLE BIOPSY OF LIVER PERQ: CPT

## 2025-07-28 PROCEDURE — 7100000010 HC PHASE TWO TIME - EACH INCREMENTAL 1 MINUTE

## 2025-07-28 PROCEDURE — 2720000007 HC OR 272 NO HCPCS

## 2025-07-28 RX ORDER — FENTANYL CITRATE 50 UG/ML
INJECTION, SOLUTION INTRAMUSCULAR; INTRAVENOUS
Status: COMPLETED | OUTPATIENT
Start: 2025-07-28 | End: 2025-07-28

## 2025-07-28 RX ORDER — SPIRONOLACTONE 50 MG/1
50 TABLET, FILM COATED ORAL
COMMUNITY
Start: 2024-12-27

## 2025-07-28 RX ORDER — FUROSEMIDE 20 MG/1
20 TABLET ORAL
COMMUNITY
Start: 2024-12-27

## 2025-07-28 RX ORDER — LIDOCAINE HYDROCHLORIDE 10 MG/ML
INJECTION, SOLUTION EPIDURAL; INFILTRATION; INTRACAUDAL; PERINEURAL
Status: COMPLETED | OUTPATIENT
Start: 2025-07-28 | End: 2025-07-28

## 2025-07-28 RX ORDER — MIDAZOLAM HYDROCHLORIDE 1 MG/ML
INJECTION, SOLUTION INTRAMUSCULAR; INTRAVENOUS
Status: COMPLETED | OUTPATIENT
Start: 2025-07-28 | End: 2025-07-28

## 2025-07-28 RX ADMIN — Medication 1 DOSE: at 09:30

## 2025-07-28 RX ADMIN — LIDOCAINE HYDROCHLORIDE 10 ML: 10 INJECTION, SOLUTION EPIDURAL; INFILTRATION; INTRACAUDAL; PERINEURAL at 09:42

## 2025-07-28 RX ADMIN — FENTANYL CITRATE 50 MCG: 50 INJECTION, SOLUTION INTRAMUSCULAR; INTRAVENOUS at 09:36

## 2025-07-28 RX ADMIN — MIDAZOLAM 2 MG: 1 INJECTION INTRAMUSCULAR; INTRAVENOUS at 09:36

## 2025-07-28 ASSESSMENT — PAIN SCALES - GENERAL
PAINLEVEL_OUTOF10: 0 - NO PAIN

## 2025-07-28 ASSESSMENT — PAIN - FUNCTIONAL ASSESSMENT
PAIN_FUNCTIONAL_ASSESSMENT: 0-10

## 2025-07-28 ASSESSMENT — COLUMBIA-SUICIDE SEVERITY RATING SCALE - C-SSRS
6. HAVE YOU EVER DONE ANYTHING, STARTED TO DO ANYTHING, OR PREPARED TO DO ANYTHING TO END YOUR LIFE?: NO
2. HAVE YOU ACTUALLY HAD ANY THOUGHTS OF KILLING YOURSELF?: NO
1. IN THE PAST MONTH, HAVE YOU WISHED YOU WERE DEAD OR WISHED YOU COULD GO TO SLEEP AND NOT WAKE UP?: NO

## 2025-07-28 NOTE — PRE-PROCEDURE NOTE
Interventional Radiology Preprocedure Note    Indication for procedure: The encounter diagnosis was Malignant neoplasm of liver, unspecified liver malignancy type (Multi).    Relevant review of systems: NA    Relevant Labs:   Lab Results   Component Value Date    INR 1.2 (H) 07/28/2025    PROTIME 13.1 (H) 07/28/2025       Planned Sedation/Anesthesia: Moderate    Airway assessment: normal    Directed physical examination:    Awake and alert, oriented  No acute distress  Regular rate, rhythm  Breathing non-labored    Mallampati: II (hard and soft palate, upper portion of tonsils and uvula visible)    ASA Score: ASA 3 - Patient with moderate systemic disease with functional limitations    Benefits, risks and alternatives of procedure and planned sedation have been discussed with the patient and/or their representative. All questions answered and they agree to proceed.

## 2025-07-28 NOTE — Clinical Note
Liver mass biopsy complete. Samples sent to lab. Site injected with gelfoam per Dr Heart. No bleeding or hematoma noted. Dressed with 4x4 and tegaderm. Pt tolerated well. Denies pain or nausea. Returned to post op for extended recovery and discharge.

## 2025-07-30 LAB
LAB AP ASR DISCLAIMER: NORMAL
LABORATORY COMMENT REPORT: NORMAL
PATH REPORT.FINAL DX SPEC: NORMAL
PATH REPORT.GROSS SPEC: NORMAL
PATH REPORT.RELEVANT HX SPEC: NORMAL
PATH REPORT.TOTAL CANCER: NORMAL

## 2025-08-13 ENCOUNTER — PREP FOR PROCEDURE (OUTPATIENT)
Dept: RADIOLOGY | Facility: HOSPITAL | Age: 80
End: 2025-08-13
Payer: MEDICARE

## 2025-08-13 DIAGNOSIS — C22.9 MALIGNANT NEOPLASM OF LIVER, UNSPECIFIED LIVER MALIGNANCY TYPE (MULTI): Primary | ICD-10-CM

## 2025-08-13 DIAGNOSIS — C22.0 HCC (HEPATOCELLULAR CARCINOMA): ICD-10-CM

## 2025-08-19 ENCOUNTER — HOSPITAL ENCOUNTER (OUTPATIENT)
Dept: RADIOLOGY | Facility: HOSPITAL | Age: 80
Discharge: HOME | End: 2025-08-19
Payer: MEDICARE

## 2025-08-19 DIAGNOSIS — C22.0 HCC (HEPATOCELLULAR CARCINOMA): ICD-10-CM

## 2025-08-26 ENCOUNTER — PREP FOR PROCEDURE (OUTPATIENT)
Dept: RADIOLOGY | Facility: HOSPITAL | Age: 80
End: 2025-08-26
Payer: MEDICARE

## 2025-08-26 DIAGNOSIS — C22.0 HCC (HEPATOCELLULAR CARCINOMA): Primary | ICD-10-CM

## 2025-09-04 ENCOUNTER — HOSPITAL ENCOUNTER (OUTPATIENT)
Dept: RADIOLOGY | Facility: HOSPITAL | Age: 80
Discharge: HOME | End: 2025-09-04
Payer: MEDICARE

## 2025-09-04 VITALS
OXYGEN SATURATION: 98 % | HEART RATE: 79 BPM | DIASTOLIC BLOOD PRESSURE: 61 MMHG | BODY MASS INDEX: 25 KG/M2 | HEIGHT: 59 IN | RESPIRATION RATE: 16 BRPM | WEIGHT: 124 LBS | SYSTOLIC BLOOD PRESSURE: 131 MMHG

## 2025-09-04 DIAGNOSIS — C22.0 HCC (HEPATOCELLULAR CARCINOMA): ICD-10-CM

## 2025-09-04 LAB
ALBUMIN SERPL BCP-MCNC: 4.2 G/DL (ref 3.4–5)
ALP SERPL-CCNC: 221 U/L (ref 33–136)
ALT SERPL W P-5'-P-CCNC: 73 U/L (ref 7–45)
ANION GAP SERPL CALC-SCNC: 14 MMOL/L (ref 10–20)
AST SERPL W P-5'-P-CCNC: 32 U/L (ref 9–39)
BILIRUB SERPL-MCNC: 0.9 MG/DL (ref 0–1.2)
BUN SERPL-MCNC: 21 MG/DL (ref 6–23)
CALCIUM SERPL-MCNC: 9.6 MG/DL (ref 8.6–10.3)
CHLORIDE SERPL-SCNC: 95 MMOL/L (ref 98–107)
CO2 SERPL-SCNC: 26 MMOL/L (ref 21–32)
CREAT SERPL-MCNC: 0.87 MG/DL (ref 0.5–1.05)
EGFRCR SERPLBLD CKD-EPI 2021: 68 ML/MIN/1.73M*2
ERYTHROCYTE [DISTWIDTH] IN BLOOD BY AUTOMATED COUNT: 13.2 % (ref 11.5–14.5)
GLUCOSE SERPL-MCNC: 387 MG/DL (ref 74–99)
HCT VFR BLD AUTO: 35.3 % (ref 36–46)
HGB BLD-MCNC: 11.3 G/DL (ref 12–16)
INR PPP: 1.1 (ref 0.9–1.1)
MCH RBC QN AUTO: 28.8 PG (ref 26–34)
MCHC RBC AUTO-ENTMCNC: 32 G/DL (ref 32–36)
MCV RBC AUTO: 90 FL (ref 80–100)
NRBC BLD-RTO: 0 /100 WBCS (ref 0–0)
PLATELET # BLD AUTO: 109 X10*3/UL (ref 150–450)
POTASSIUM SERPL-SCNC: 4.6 MMOL/L (ref 3.5–5.3)
PROT SERPL-MCNC: 7.1 G/DL (ref 6.4–8.2)
PROTHROMBIN TIME: 12.4 SECONDS (ref 9.8–12.4)
RBC # BLD AUTO: 3.92 X10*6/UL (ref 4–5.2)
SODIUM SERPL-SCNC: 130 MMOL/L (ref 136–145)
WBC # BLD AUTO: 5.8 X10*3/UL (ref 4.4–11.3)

## 2025-09-04 PROCEDURE — 2550000001 HC RX 255 CONTRASTS: Performed by: RADIOLOGY

## 2025-09-04 PROCEDURE — C1887 CATHETER, GUIDING: HCPCS

## 2025-09-04 PROCEDURE — 99152 MOD SED SAME PHYS/QHP 5/>YRS: CPT

## 2025-09-04 PROCEDURE — 36415 COLL VENOUS BLD VENIPUNCTURE: CPT | Performed by: RADIOLOGY

## 2025-09-04 PROCEDURE — 7100000009 HC PHASE TWO TIME - INITIAL BASE CHARGE

## 2025-09-04 PROCEDURE — 80053 COMPREHEN METABOLIC PANEL: CPT | Performed by: RADIOLOGY

## 2025-09-04 PROCEDURE — 99153 MOD SED SAME PHYS/QHP EA: CPT

## 2025-09-04 PROCEDURE — 36221 PLACE CATH THORACIC AORTA: CPT | Performed by: RADIOLOGY

## 2025-09-04 PROCEDURE — 96373 THER/PROPH/DIAG INJ IA: CPT | Performed by: NURSE PRACTITIONER

## 2025-09-04 PROCEDURE — C1769 GUIDE WIRE: HCPCS

## 2025-09-04 PROCEDURE — 2500000005 HC RX 250 GENERAL PHARMACY W/O HCPCS: Performed by: RADIOLOGY

## 2025-09-04 PROCEDURE — 75726 ARTERY X-RAYS ABDOMEN: CPT | Performed by: RADIOLOGY

## 2025-09-04 PROCEDURE — A9540 TC99M MAA: HCPCS | Performed by: NURSE PRACTITIONER

## 2025-09-04 PROCEDURE — 36246 INS CATH ABD/L-EXT ART 2ND: CPT | Performed by: RADIOLOGY

## 2025-09-04 PROCEDURE — 76937 US GUIDE VASCULAR ACCESS: CPT | Mod: RT | Performed by: RADIOLOGY

## 2025-09-04 PROCEDURE — 2720000007 HC OR 272 NO HCPCS

## 2025-09-04 PROCEDURE — C1760 CLOSURE DEV, VASC: HCPCS

## 2025-09-04 PROCEDURE — 36247 INS CATH ABD/L-EXT ART 3RD: CPT | Performed by: RADIOLOGY

## 2025-09-04 PROCEDURE — 36248 INS CATH ABD/L-EXT ART ADDL: CPT | Mod: XS,LT | Performed by: RADIOLOGY

## 2025-09-04 PROCEDURE — 76376 3D RENDER W/INTRP POSTPROCES: CPT | Performed by: RADIOLOGY

## 2025-09-04 PROCEDURE — 77001 FLUOROGUIDE FOR VEIN DEVICE: CPT | Performed by: RADIOLOGY

## 2025-09-04 PROCEDURE — 85610 PROTHROMBIN TIME: CPT | Performed by: RADIOLOGY

## 2025-09-04 PROCEDURE — 2780000003 HC OR 278 NO HCPCS

## 2025-09-04 PROCEDURE — A9541 TC99M SULFUR COLLOID: HCPCS | Performed by: NURSE PRACTITIONER

## 2025-09-04 PROCEDURE — 3430000001 HC RX 343 DIAGNOSTIC RADIOPHARMACEUTICALS: Performed by: NURSE PRACTITIONER

## 2025-09-04 PROCEDURE — 85027 COMPLETE CBC AUTOMATED: CPT | Performed by: RADIOLOGY

## 2025-09-04 PROCEDURE — 7100000010 HC PHASE TWO TIME - EACH INCREMENTAL 1 MINUTE

## 2025-09-04 PROCEDURE — 79445 NUCLEAR RX INTRA-ARTERIAL: CPT

## 2025-09-04 PROCEDURE — 2500000004 HC RX 250 GENERAL PHARMACY W/ HCPCS (ALT 636 FOR OP/ED): Performed by: RADIOLOGY

## 2025-09-04 PROCEDURE — 75710 ARTERY X-RAYS ARM/LEG: CPT | Mod: RT | Performed by: RADIOLOGY

## 2025-09-04 RX ORDER — FENTANYL CITRATE 50 UG/ML
INJECTION, SOLUTION INTRAMUSCULAR; INTRAVENOUS
Status: COMPLETED | OUTPATIENT
Start: 2025-09-04 | End: 2025-09-04

## 2025-09-04 RX ORDER — MIDAZOLAM HYDROCHLORIDE 1 MG/ML
INJECTION, SOLUTION INTRAMUSCULAR; INTRAVENOUS
Status: COMPLETED | OUTPATIENT
Start: 2025-09-04 | End: 2025-09-04

## 2025-09-04 RX ORDER — LIDOCAINE HYDROCHLORIDE 10 MG/ML
INJECTION, SOLUTION EPIDURAL; INFILTRATION; INTRACAUDAL; PERINEURAL
Status: COMPLETED | OUTPATIENT
Start: 2025-09-04 | End: 2025-09-04

## 2025-09-04 RX ADMIN — FENTANYL CITRATE 50 MCG: 50 INJECTION, SOLUTION INTRAMUSCULAR; INTRAVENOUS at 10:03

## 2025-09-04 RX ADMIN — KIT FOR THE PREPARATION OF TECHNETIUM TC 99M ALBUMIN AGGREGATED 2 MILLICURIE: 2.5 INJECTION, POWDER, FOR SOLUTION INTRAVENOUS at 11:15

## 2025-09-04 RX ADMIN — Medication 1 DOSE: at 09:42

## 2025-09-04 RX ADMIN — MIDAZOLAM 1 MG: 1 INJECTION INTRAMUSCULAR; INTRAVENOUS at 11:00

## 2025-09-04 RX ADMIN — MIDAZOLAM 1 MG: 1 INJECTION INTRAMUSCULAR; INTRAVENOUS at 10:03

## 2025-09-04 RX ADMIN — TECHNETIUM TC 99M SULFUR COLLOID KIT 10 MILLICURIE: KIT at 13:30

## 2025-09-04 RX ADMIN — LIDOCAINE HYDROCHLORIDE 3 ML: 10 INJECTION, SOLUTION EPIDURAL; INFILTRATION; INTRACAUDAL; PERINEURAL at 10:19

## 2025-09-04 RX ADMIN — IOHEXOL 100 ML: 350 INJECTION, SOLUTION INTRAVENOUS at 11:14

## 2025-09-04 ASSESSMENT — PAIN SCALES - GENERAL

## 2025-09-04 ASSESSMENT — PAIN - FUNCTIONAL ASSESSMENT: PAIN_FUNCTIONAL_ASSESSMENT: 0-10

## (undated) DEVICE — MARKER,SKIN,WI/RULER AND LABELS: Brand: MEDLINE

## (undated) DEVICE — GLOVE SURG SZ 75 CRM LTX FREE POLYISOPRENE POLYMER BEAD ANTI

## (undated) DEVICE — Device

## (undated) DEVICE — INTERTAN LAG SCREW DRILL: Brand: TRIGEN

## (undated) DEVICE — ELECTRODE PT RET AD L9FT HI MOIST COND ADH HYDRGEL CORDED

## (undated) DEVICE — GOWN,SIRUS,NONRNF,SETINSLV,XL,20/CS: Brand: MEDLINE

## (undated) DEVICE — DRESSING TRNSPAR W5XL4.5IN FLM SHT SEMIPERMEABLE WIND

## (undated) DEVICE — 1LYRTR 16FR10ML100%SIL UMS SNP: Brand: MEDLINE INDUSTRIES, INC.

## (undated) DEVICE — GLOVE ORANGE PI 8 1/2   MSG9085

## (undated) DEVICE — BANDAGE COMPR W6INXL12FT SMOOTH FOR LIMB EXSANG ESMARCH

## (undated) DEVICE — DRAPE,TOWEL,LARGE,INVISISHIELD: Brand: MEDLINE

## (undated) DEVICE — STRAP,POSITIONING,KNEE/BODY,FOAM,4X60": Brand: MEDLINE

## (undated) DEVICE — SCRUB SURG 4% CHG W FOAM SPNG DYNA HEX

## (undated) DEVICE — SUTURE VCRL SZ 2-0 L18IN ABSRB UD CT-1 L36MM 1/2 CIR J839D

## (undated) DEVICE — 3.0MM X 1000MM BALL TIP GUIDE ROD: Brand: TRIGEN

## (undated) DEVICE — GLOVE ORANGE PI 7 1/2   MSG9075

## (undated) DEVICE — SURGICAL SUCTION CONNECTING TUBE WITH MALE CONNECTOR AND SUCTION CLAMP, 2 FT. LONG (.6 M), 5 MM I.D.: Brand: CONMED

## (undated) DEVICE — C-ARMOR C-ARM EQUIPMENT COVERS CLEAR STERILE UNIVERSAL FIT 12 PER CASE: Brand: C-ARMOR

## (undated) DEVICE — SVMMC ORTH SPL DRP PK

## (undated) DEVICE — GLOVE ORTHO 7 1/2   MSG9475

## (undated) DEVICE — ROPE TRACTION STERILE 72IN

## (undated) DEVICE — GLOVE ORTHO 8   MSG9480

## (undated) DEVICE — GOWN,SIRUS,NONRNF,SETINSLV,2XL,18/CS: Brand: MEDLINE

## (undated) DEVICE — GUIDE PIN 3.2MM X 343MM: Brand: TRIGEN

## (undated) DEVICE — GLOVE SURG SZ 7 L12IN THK7.5MIL DK GRN LTX FREE MSG6570] MEDLINE INDUSTRIES INC]

## (undated) DEVICE — C-ARM: Brand: UNBRANDED

## (undated) DEVICE — STOCKINETTE,IMPERVIOUS,12X48,STERILE: Brand: MEDLINE

## (undated) DEVICE — SUTURE VCRL SZ 0 L18IN ABSRB UD L36MM CT-1 1/2 CIR J840D

## (undated) DEVICE — GLOVE ORANGE PI 8   MSG9080

## (undated) DEVICE — CYSTO/BLADDER IRRIGATION SET, REGULATING CLAMP

## (undated) DEVICE — BANDAGE COBAN 4 IN COMPR W4INXL5YD FOAM COHESIVE QUIK STK SELF ADH SFT

## (undated) DEVICE — SYRINGE IRRIG 60ML SFT PLIABLE BLB EZ TO GRP 1 HND USE W/

## (undated) DEVICE — BLADE SURG NO10 C STL SHRP PREM

## (undated) DEVICE — 1016 S-DRAPE IRRIG POUCH 10/BOX: Brand: STERI-DRAPE™

## (undated) DEVICE — DRAPE,U/ SHT,SPLIT,PLAS,STERIL: Brand: MEDLINE

## (undated) DEVICE — 4.0MM SHORT PILOT DRILL WITH AO CONNECTOR: Brand: TRIGEN

## (undated) DEVICE — SHEET,DRAPE,70X100,STERILE: Brand: MEDLINE

## (undated) DEVICE — GLOVE SURG SZ 8 L12IN FNGR THK79MIL GRN LTX FREE

## (undated) DEVICE — DRAPE,REIN 53X77,STERILE: Brand: MEDLINE

## (undated) DEVICE — GOWN,AURORA,NONREINFORCED,LARGE: Brand: MEDLINE